# Patient Record
Sex: MALE | Race: WHITE | Employment: FULL TIME | URBAN - METROPOLITAN AREA
[De-identification: names, ages, dates, MRNs, and addresses within clinical notes are randomized per-mention and may not be internally consistent; named-entity substitution may affect disease eponyms.]

---

## 2018-05-03 ENCOUNTER — HOSPITAL ENCOUNTER (OUTPATIENT)
Dept: SURGERY | Age: 43
Discharge: HOME | End: 2018-05-03

## 2018-05-04 VITALS — HEIGHT: 67 IN | WEIGHT: 227 LBS | BODY MASS INDEX: 35.63 KG/M2

## 2018-05-04 RX ORDER — METFORMIN HYDROCHLORIDE 500 MG/1
500 TABLET ORAL 2 TIMES DAILY WITH MEALS
COMMUNITY
End: 2018-05-17 | Stop reason: SDUPTHER

## 2018-05-04 RX ORDER — LISINOPRIL 20 MG/1
20 TABLET ORAL DAILY
COMMUNITY
End: 2018-05-17 | Stop reason: SDUPTHER

## 2018-05-04 RX ORDER — BISMUTH SUBSALICYLATE 262 MG
1 TABLET,CHEWABLE ORAL DAILY
COMMUNITY
End: 2018-11-09 | Stop reason: ALTCHOICE

## 2018-05-04 RX ORDER — MULTIVITAMIN
1 TABLET ORAL DAILY
COMMUNITY
End: 2018-06-25 | Stop reason: ALTCHOICE

## 2018-05-04 NOTE — PERIOP NOTES
Patient verified name, , and surgery as listed in [facility]. Type 1B surgery, phone assessment complete. Orders  received. Labs per surgeon: none  Labs per anesthesia protocol: none      Patient answered medical/surgical history questions at their best of ability. All prior to admission medications documented in [facility]. Patient instructed to take the following medications the day of surgery according to anesthesia guidelines with a small sip of water: none . Hold all vitamins 7 days prior to surgery and NSAIDS 5 days prior to surgery. Medications to be held vitamins/supplements. Pt instructed to take 1/2 normal dose of Toujeo DOS or 17 units. Patient instructed on the following:  Arrive at A Entrance, time of arrival to be called the day before by 1700  NPO after midnight including gum, mints, and ice chips  Responsible adult must drive patient to the hospital, stay during surgery, and patient will  need supervision 24 hours after anesthesia  Use dial in shower the night before surgery and on the morning of surgery  Leave all valuables (money and jewelry) at home but bring insurance card and ID on  DOS  Do not wear make-up, nail polish, lotions, cologne, perfumes, powders, or oil on skin. Patient teach back successful and patient demonstrates knowledge of instruction.

## 2018-05-05 PROBLEM — S43.432A SUPERIOR GLENOID LABRUM LESION OF LEFT SHOULDER: Status: ACTIVE | Noted: 2018-05-05

## 2018-05-05 PROBLEM — S46.112A STRAIN OF MUSCLE, FASCIA AND TENDON OF LONG HEAD OF BICEPS, LEFT ARM, INITIAL ENCOUNTER: Status: ACTIVE | Noted: 2018-05-05

## 2018-05-05 PROBLEM — S46.012A TRAUMATIC TEAR OF LEFT ROTATOR CUFF: Status: ACTIVE | Noted: 2018-05-05

## 2018-05-05 PROBLEM — M19.012 DEGENERATIVE JOINT DISEASE OF LEFT ACROMIOCLAVICULAR JOINT: Status: ACTIVE | Noted: 2018-05-05

## 2018-05-05 NOTE — BRIEF OP NOTE
BRIEF OPERATIVE NOTE    Date of Procedure: 5/10/2018     Preoperative Diagnosis:  PARTIAL THICKNESS ROTATOR CUFF TEAR LEFT SHOULDER      BICEPITAL STRAIN LEFT SHOULDER      SLAP TEAR LEFT SHOULDER      AC OA LEFT SHOULDER    Postoperative Diagnosis:  SLAP TEAR LEFT SHOULDER      BICEPITAL STRAIN LEFT SHOULDER      AC OA LEFT SHOULDER    Procedure(s): ARTHROSCOPY LEFT SHOULDER ARTHROSCOPIC SUBACROMIAL DECOMPRESSION, DISTAL CLAVICLE RESECTION, EXTENSIVE DEBRIDEMENT SLAP TEAR, GLENOHUMERAL JOINT, SUBACROMIAL SPACE, MINI BICEPS TENODESIS    Surgeon(s) and Role:     * Sukh Hernandes MD - Primary         Assistant Staff: None      Surgical Staff:  Circ-1: (Unknown)  Scrub Tech-1: (Unknown)  Scrub Tech-2: (Unknown)  Scrub Tech-3: (Unknown)  No case tracking events are documented in the log. Anesthesia:  GENERAL WITH INTERSCALENE BLOCK    Estimated Blood Loss: 20 cc. Complications: NONE    Implants:     Implant Name Type Inv.  Item Serial No.  Lot No. LRB No. Used Action   ANCHOR SUT 5.5MM W/NDL PEEK ZP --  - [id number]   ANCHOR SUT 5.5MM W/NDL PEEK ZP --  [id number] [employer] [id number] Left 1 Implanted       Sukh Hernandes MD

## 2018-05-05 NOTE — H&P
[facility] HISTORY AND PHYSICAL    Subjective:     Patient is a 37 y.o. RHD MALE WITH LEFT SHOULDER PAIN. SEE OFFICE NOTE. Patient Active Problem List    Diagnosis Date Noted    Traumatic tear of left rotator cuff 05/05/2018    Strain of muscle, fascia and tendon of long head of biceps, left arm, initial encounter 05/05/2018    Superior glenoid labrum lesion of left shoulder 05/05/2018    Degenerative joint disease of left acromioclavicular joint 05/05/2018     Past Medical History:   Diagnosis Date    Degenerative joint disease of left acromioclavicular joint 5/5/2018    Diabetes ([facility] [address] 2008    type 2.normal fasting (150-200). oral agent and insulin daily     Hypertension     controlled w/med    Strain of muscle(s) and tendon(s) of the rotator cuff of left shoulder, initial encounter     Strain of muscle, fascia and tendon of long head of biceps, left arm, initial encounter       Past Surgical History:   Procedure Laterality Date    HX ACL RECONSTRUCTION      HX HERNIA REPAIR        Prior to Admission medications    Medication Sig Start Date End Date Taking? Authorizing Provider   lisinopril (PRINIVIL, ZESTRIL) 20 mg tablet Take 20 mg by mouth daily. Indications: hypertension    Historical Provider   metFORMIN (GLUCOPHAGE) 500 mg tablet Take 500 mg by mouth two (2) times daily (with meals). Indications: type 2 diabetes mellitus    Historical Provider   multivitamin (ONE A DAY) tablet Take 1 Tab by mouth daily. Per anesthesia protocol: pt instructed to stop med 7 days prior to day of surgery. Historical Provider   cinnamon bark (CINNAMON) 500 mg cap Take 1 Cap by mouth daily. Per anesthesia protocol: pt instructed to stop med 7 days prior to day of surgery. Historical Provider   insulin glargine (TOUJEO MAX SOLOSTAR) 300 unit/mL (3 mL) inpn 35 Units by SubCUTAneous route daily. Per anesthesia protocol:instructed to take 1/2 normal dose (17 units) DOS.    Indications: type 2 diabetes mellitus    Historical Provider     No Known Allergies   Social History   Substance Use Topics    Smoking status: Never Smoker    Smokeless tobacco: Never Used    Alcohol use No      No family history on file. Review of Systems  A comprehensive review of systems was negative except for that written in the HPI. Objective:     No data found. There were no vitals taken for this visit. General:  Alert, cooperative, no distress, appears stated age. Head:  Normocephalic, without obvious abnormality, atraumatic. Back:   Symmetric, no curvature. ROM normal. No CVA tenderness. Lungs:   Clear to auscultation bilaterally. Chest wall:  No tenderness or deformity. Heart:  Regular rate and rhythm, S1, S2 normal, no murmur, click, rub or gallop. Extremities: Extremities normal, atraumatic, no cyanosis or edema. Pulses: 2+ and symmetric all extremities. Skin: Skin color, texture, turgor normal. No rashes or lesions   Lymph nodes: Cervical, supraclavicular, and axillary nodes normal.   Neurologic: CNII-XII intact. Normal strength, sensation and reflexes throughout. Assessment:     Principal Problem:    Traumatic tear of left rotator cuff (5/5/2018)    Active Problems:    Strain of muscle, fascia and tendon of long head of biceps, left arm, initial encounter (5/5/2018)      Superior glenoid labrum lesion of left shoulder (5/5/2018)      Degenerative joint disease of left acromioclavicular joint (5/5/2018)        Plan:     The various methods of treatment have been discussed with the patient and family. PATIENT HAS EXHAUSTED NON-OPERATIVE MODALITIES     After consideration of risks, benefits and other options for treatment, the patient has consented to surgical intervention.     SEE OFFICE NOTE    Anum Castro MD

## 2018-05-09 ENCOUNTER — ANESTHESIA EVENT (OUTPATIENT)
Dept: SURGERY | Age: 43
End: 2018-05-09
Payer: OTHER MISCELLANEOUS

## 2018-05-10 ENCOUNTER — APPOINTMENT (OUTPATIENT)
Dept: GENERAL RADIOLOGY | Age: 43
End: 2018-05-10
Attending: ORTHOPAEDIC SURGERY
Payer: OTHER MISCELLANEOUS

## 2018-05-10 ENCOUNTER — ANESTHESIA (OUTPATIENT)
Dept: SURGERY | Age: 43
End: 2018-05-10
Payer: OTHER MISCELLANEOUS

## 2018-05-10 ENCOUNTER — HOSPITAL ENCOUNTER (OUTPATIENT)
Age: 43
Setting detail: OBSERVATION
Discharge: HOME | End: 2018-05-11
Attending: ORTHOPAEDIC SURGERY | Admitting: ORTHOPAEDIC SURGERY
Payer: OTHER MISCELLANEOUS

## 2018-05-10 PROBLEM — S46.012A TRAUMATIC TEAR OF LEFT ROTATOR CUFF: Status: RESOLVED | Noted: 2018-05-05 | Resolved: 2018-05-10

## 2018-05-10 PROBLEM — I10 HTN (HYPERTENSION): Status: ACTIVE | Noted: 2018-05-10

## 2018-05-10 LAB
EST. AVERAGE GLUCOSE BLD GHB EST-MCNC: 246 MG/DL
GLUCOSE BLD STRIP.AUTO-MCNC: 236 MG/DL (ref 65–100)
GLUCOSE BLD STRIP.AUTO-MCNC: 256 MG/DL (ref 65–100)
GLUCOSE BLD STRIP.AUTO-MCNC: 262 MG/DL (ref 65–100)
GLUCOSE BLD STRIP.AUTO-MCNC: 287 MG/DL (ref 65–100)
GLUCOSE BLD STRIP.AUTO-MCNC: 341 MG/DL (ref 65–100)
HBA1C MFR BLD: 10.2 % (ref 4.8–6)

## 2018-05-10 PROCEDURE — 77030033005 HC TBNG ARTHSC PMP STRY -B: Performed by: ORTHOPAEDIC SURGERY

## 2018-05-10 PROCEDURE — 82962 GLUCOSE BLOOD TEST: CPT

## 2018-05-10 PROCEDURE — 77030019908 HC STETH ESOPH SIMS -A: Performed by: ANESTHESIOLOGY

## 2018-05-10 PROCEDURE — 77030027384 HC PRB ARTHSCP SERFAS STRY -C: Performed by: ORTHOPAEDIC SURGERY

## 2018-05-10 PROCEDURE — 74011636637 HC RX REV CODE- 636/637: Performed by: INTERNAL MEDICINE

## 2018-05-10 PROCEDURE — C1713 ANCHOR/SCREW BN/BN,TIS/BN: HCPCS | Performed by: ORTHOPAEDIC SURGERY

## 2018-05-10 PROCEDURE — 77030032490 HC SLV COMPR SCD KNE COVD -B

## 2018-05-10 PROCEDURE — 77030006668 HC BLD SHV MENSCS STRY -B: Performed by: ORTHOPAEDIC SURGERY

## 2018-05-10 PROCEDURE — 77030008703 HC TU ET UNCUF COVD -A: Performed by: ANESTHESIOLOGY

## 2018-05-10 PROCEDURE — 76060000034 HC ANESTHESIA 1.5 TO 2 HR: Performed by: ORTHOPAEDIC SURGERY

## 2018-05-10 PROCEDURE — A4565 SLINGS: HCPCS | Performed by: ORTHOPAEDIC SURGERY

## 2018-05-10 PROCEDURE — 77030006891 HC BLD SHV RESECT STRY -B: Performed by: ORTHOPAEDIC SURGERY

## 2018-05-10 PROCEDURE — 74011000258 HC RX REV CODE- 258: Performed by: ORTHOPAEDIC SURGERY

## 2018-05-10 PROCEDURE — 76010000161 HC OR TIME 1 TO 1.5 HR INTENSV-TIER 1: Performed by: ORTHOPAEDIC SURGERY

## 2018-05-10 PROCEDURE — 77030020782 HC GWN BAIR PAWS FLX 3M -B: Performed by: ANESTHESIOLOGY

## 2018-05-10 PROCEDURE — 77030003666 HC NDL SPINAL BD -A: Performed by: ORTHOPAEDIC SURGERY

## 2018-05-10 PROCEDURE — 74011250636 HC RX REV CODE- 250/636

## 2018-05-10 PROCEDURE — 77030031139 HC SUT VCRL2 J&J -A: Performed by: ORTHOPAEDIC SURGERY

## 2018-05-10 PROCEDURE — 77030002986 HC SUT PROL J&J -A: Performed by: ORTHOPAEDIC SURGERY

## 2018-05-10 PROCEDURE — 77030003602 HC NDL NRV BLK BBMI -B: Performed by: ANESTHESIOLOGY

## 2018-05-10 PROCEDURE — 99218 HC RM OBSERVATION: CPT

## 2018-05-10 PROCEDURE — 74011250636 HC RX REV CODE- 250/636: Performed by: ORTHOPAEDIC SURGERY

## 2018-05-10 PROCEDURE — 77030002916 HC SUT ETHLN J&J -A: Performed by: ORTHOPAEDIC SURGERY

## 2018-05-10 PROCEDURE — 77030018836 HC SOL IRR NACL ICUM -A: Performed by: ORTHOPAEDIC SURGERY

## 2018-05-10 PROCEDURE — 74011250637 HC RX REV CODE- 250/637: Performed by: ORTHOPAEDIC SURGERY

## 2018-05-10 PROCEDURE — 74011250637 HC RX REV CODE- 250/637: Performed by: ANESTHESIOLOGY

## 2018-05-10 PROCEDURE — 77030004453 HC BUR SHV STRY -B: Performed by: ORTHOPAEDIC SURGERY

## 2018-05-10 PROCEDURE — 74011000250 HC RX REV CODE- 250

## 2018-05-10 PROCEDURE — 73030 X-RAY EXAM OF SHOULDER: CPT

## 2018-05-10 PROCEDURE — 77030027138 HC INCENT SPIROMETER -A: Performed by: ORTHOPAEDIC SURGERY

## 2018-05-10 PROCEDURE — 94760 N-INVAS EAR/PLS OXIMETRY 1: CPT

## 2018-05-10 PROCEDURE — 83036 HEMOGLOBIN GLYCOSYLATED A1C: CPT | Performed by: ORTHOPAEDIC SURGERY

## 2018-05-10 PROCEDURE — 74011636637 HC RX REV CODE- 636/637: Performed by: ANESTHESIOLOGY

## 2018-05-10 PROCEDURE — 77030018986 HC SUT ETHBND4 J&J -B: Performed by: ORTHOPAEDIC SURGERY

## 2018-05-10 PROCEDURE — 76210000006 HC OR PH I REC 0.5 TO 1 HR: Performed by: ORTHOPAEDIC SURGERY

## 2018-05-10 PROCEDURE — 74011000250 HC RX REV CODE- 250: Performed by: ORTHOPAEDIC SURGERY

## 2018-05-10 PROCEDURE — 74011250636 HC RX REV CODE- 250/636: Performed by: ANESTHESIOLOGY

## 2018-05-10 PROCEDURE — 74011250637 HC RX REV CODE- 250/637: Performed by: INTERNAL MEDICINE

## 2018-05-10 PROCEDURE — 77030033073 HC TBNG ARTHSC PMP OUTFLO STRY -B: Performed by: ORTHOPAEDIC SURGERY

## 2018-05-10 DEVICE — 5.5MM PEEK ZIP SUTURE ANCHOR WITH ¿ CIRCLE TAPER NEEDLES, #2 FORCE FIBER
Type: IMPLANTABLE DEVICE | Site: SHOULDER | Status: FUNCTIONAL
Brand: PEEK ZIP

## 2018-05-10 RX ORDER — SODIUM CHLORIDE 0.9 % (FLUSH) 0.9 %
5-10 SYRINGE (ML) INJECTION AS NEEDED
Status: DISCONTINUED | OUTPATIENT
Start: 2018-05-10 | End: 2018-05-10 | Stop reason: HOSPADM

## 2018-05-10 RX ORDER — NALOXONE HYDROCHLORIDE 0.4 MG/ML
0.2 INJECTION, SOLUTION INTRAMUSCULAR; INTRAVENOUS; SUBCUTANEOUS AS NEEDED
Status: DISCONTINUED | OUTPATIENT
Start: 2018-05-10 | End: 2018-05-10 | Stop reason: HOSPADM

## 2018-05-10 RX ORDER — FACIAL-BODY WIPES
10 EACH TOPICAL DAILY PRN
Status: DISCONTINUED | OUTPATIENT
Start: 2018-05-10 | End: 2018-05-11 | Stop reason: HOSPADM

## 2018-05-10 RX ORDER — SODIUM CHLORIDE, SODIUM LACTATE, POTASSIUM CHLORIDE, CALCIUM CHLORIDE 600; 310; 30; 20 MG/100ML; MG/100ML; MG/100ML; MG/100ML
75 INJECTION, SOLUTION INTRAVENOUS CONTINUOUS
Status: DISCONTINUED | OUTPATIENT
Start: 2018-05-10 | End: 2018-05-10 | Stop reason: HOSPADM

## 2018-05-10 RX ORDER — METFORMIN HYDROCHLORIDE 500 MG/1
500 TABLET ORAL 2 TIMES DAILY WITH MEALS
Status: DISCONTINUED | OUTPATIENT
Start: 2018-05-10 | End: 2018-05-11 | Stop reason: HOSPADM

## 2018-05-10 RX ORDER — INSULIN GLARGINE 100 [IU]/ML
28 INJECTION, SOLUTION SUBCUTANEOUS DAILY
Status: DISCONTINUED | OUTPATIENT
Start: 2018-05-11 | End: 2018-05-11 | Stop reason: HOSPADM

## 2018-05-10 RX ORDER — LIDOCAINE HYDROCHLORIDE AND EPINEPHRINE 10; 10 MG/ML; UG/ML
INJECTION, SOLUTION INFILTRATION; PERINEURAL AS NEEDED
Status: DISCONTINUED | OUTPATIENT
Start: 2018-05-10 | End: 2018-05-10 | Stop reason: HOSPADM

## 2018-05-10 RX ORDER — LIDOCAINE HYDROCHLORIDE 10 MG/ML
0.1 INJECTION INFILTRATION; PERINEURAL AS NEEDED
Status: DISCONTINUED | OUTPATIENT
Start: 2018-05-10 | End: 2018-05-10 | Stop reason: HOSPADM

## 2018-05-10 RX ORDER — FAMOTIDINE 20 MG/1
20 TABLET, FILM COATED ORAL ONCE
Status: COMPLETED | OUTPATIENT
Start: 2018-05-10 | End: 2018-05-10

## 2018-05-10 RX ORDER — ROCURONIUM BROMIDE 10 MG/ML
INJECTION, SOLUTION INTRAVENOUS AS NEEDED
Status: DISCONTINUED | OUTPATIENT
Start: 2018-05-10 | End: 2018-05-10 | Stop reason: HOSPADM

## 2018-05-10 RX ORDER — HYDROMORPHONE HYDROCHLORIDE 2 MG/1
2 TABLET ORAL
Status: DISCONTINUED | OUTPATIENT
Start: 2018-05-10 | End: 2018-05-11 | Stop reason: HOSPADM

## 2018-05-10 RX ORDER — OXYCODONE AND ACETAMINOPHEN 5; 325 MG/1; MG/1
1 TABLET ORAL AS NEEDED
Status: DISCONTINUED | OUTPATIENT
Start: 2018-05-10 | End: 2018-05-10 | Stop reason: HOSPADM

## 2018-05-10 RX ORDER — LISINOPRIL 20 MG/1
20 TABLET ORAL DAILY
Status: DISCONTINUED | OUTPATIENT
Start: 2018-05-10 | End: 2018-05-11 | Stop reason: HOSPADM

## 2018-05-10 RX ORDER — SUCCINYLCHOLINE CHLORIDE 20 MG/ML
INJECTION INTRAMUSCULAR; INTRAVENOUS AS NEEDED
Status: DISCONTINUED | OUTPATIENT
Start: 2018-05-10 | End: 2018-05-10 | Stop reason: HOSPADM

## 2018-05-10 RX ORDER — DOCUSATE SODIUM 100 MG/1
100 CAPSULE, LIQUID FILLED ORAL 2 TIMES DAILY
Status: DISCONTINUED | OUTPATIENT
Start: 2018-05-11 | End: 2018-05-11 | Stop reason: HOSPADM

## 2018-05-10 RX ORDER — METFORMIN HYDROCHLORIDE 500 MG/1
500 TABLET ORAL 2 TIMES DAILY WITH MEALS
Status: DISCONTINUED | OUTPATIENT
Start: 2018-05-11 | End: 2018-05-10 | Stop reason: SDUPTHER

## 2018-05-10 RX ORDER — PROMETHAZINE HYDROCHLORIDE 25 MG/1
25 TABLET ORAL
Status: DISCONTINUED | OUTPATIENT
Start: 2018-05-10 | End: 2018-05-11 | Stop reason: HOSPADM

## 2018-05-10 RX ORDER — MIDAZOLAM HYDROCHLORIDE 1 MG/ML
2 INJECTION, SOLUTION INTRAMUSCULAR; INTRAVENOUS
Status: DISCONTINUED | OUTPATIENT
Start: 2018-05-10 | End: 2018-05-10 | Stop reason: HOSPADM

## 2018-05-10 RX ORDER — LIDOCAINE HYDROCHLORIDE 20 MG/ML
INJECTION, SOLUTION EPIDURAL; INFILTRATION; INTRACAUDAL; PERINEURAL AS NEEDED
Status: DISCONTINUED | OUTPATIENT
Start: 2018-05-10 | End: 2018-05-10 | Stop reason: HOSPADM

## 2018-05-10 RX ORDER — PROPOFOL 10 MG/ML
INJECTION, EMULSION INTRAVENOUS AS NEEDED
Status: DISCONTINUED | OUTPATIENT
Start: 2018-05-10 | End: 2018-05-10 | Stop reason: HOSPADM

## 2018-05-10 RX ORDER — TEMAZEPAM 15 MG/1
15 CAPSULE ORAL
Status: DISCONTINUED | OUTPATIENT
Start: 2018-05-10 | End: 2018-05-11 | Stop reason: HOSPADM

## 2018-05-10 RX ORDER — HYDROMORPHONE HYDROCHLORIDE 4 MG/1
4 TABLET ORAL
Status: DISCONTINUED | OUTPATIENT
Start: 2018-05-10 | End: 2018-05-11 | Stop reason: HOSPADM

## 2018-05-10 RX ORDER — HYDROMORPHONE HYDROCHLORIDE 2 MG/ML
0.5 INJECTION, SOLUTION INTRAMUSCULAR; INTRAVENOUS; SUBCUTANEOUS
Status: DISCONTINUED | OUTPATIENT
Start: 2018-05-10 | End: 2018-05-10 | Stop reason: HOSPADM

## 2018-05-10 RX ORDER — SODIUM CHLORIDE 9 MG/ML
75 INJECTION, SOLUTION INTRAVENOUS CONTINUOUS
Status: DISPENSED | OUTPATIENT
Start: 2018-05-10 | End: 2018-05-11

## 2018-05-10 RX ORDER — SODIUM CHLORIDE 0.9 % (FLUSH) 0.9 %
5-10 SYRINGE (ML) INJECTION EVERY 8 HOURS
Status: DISCONTINUED | OUTPATIENT
Start: 2018-05-10 | End: 2018-05-11 | Stop reason: HOSPADM

## 2018-05-10 RX ORDER — CEFAZOLIN SODIUM/WATER 2 G/20 ML
2 SYRINGE (ML) INTRAVENOUS ONCE
Status: COMPLETED | OUTPATIENT
Start: 2018-05-10 | End: 2018-05-10

## 2018-05-10 RX ORDER — SODIUM CHLORIDE 0.9 % (FLUSH) 0.9 %
5-10 SYRINGE (ML) INJECTION AS NEEDED
Status: DISCONTINUED | OUTPATIENT
Start: 2018-05-10 | End: 2018-05-11 | Stop reason: HOSPADM

## 2018-05-10 RX ORDER — HYDROMORPHONE HYDROCHLORIDE 2 MG/ML
1 INJECTION, SOLUTION INTRAMUSCULAR; INTRAVENOUS; SUBCUTANEOUS
Status: DISCONTINUED | OUTPATIENT
Start: 2018-05-10 | End: 2018-05-11 | Stop reason: HOSPADM

## 2018-05-10 RX ORDER — POLYETHYLENE GLYCOL 3350 17 G/17G
17 POWDER, FOR SOLUTION ORAL
Status: DISCONTINUED | OUTPATIENT
Start: 2018-05-10 | End: 2018-05-11 | Stop reason: HOSPADM

## 2018-05-10 RX ORDER — ONDANSETRON 2 MG/ML
INJECTION INTRAMUSCULAR; INTRAVENOUS AS NEEDED
Status: DISCONTINUED | OUTPATIENT
Start: 2018-05-10 | End: 2018-05-10 | Stop reason: HOSPADM

## 2018-05-10 RX ORDER — INSULIN LISPRO 100 [IU]/ML
INJECTION, SOLUTION INTRAVENOUS; SUBCUTANEOUS
Status: DISCONTINUED | OUTPATIENT
Start: 2018-05-10 | End: 2018-05-11 | Stop reason: HOSPADM

## 2018-05-10 RX ORDER — INSULIN GLARGINE 100 [IU]/ML
30 INJECTION, SOLUTION SUBCUTANEOUS DAILY
Status: DISCONTINUED | OUTPATIENT
Start: 2018-05-11 | End: 2018-05-10 | Stop reason: SDUPTHER

## 2018-05-10 RX ORDER — FENTANYL CITRATE 50 UG/ML
INJECTION, SOLUTION INTRAMUSCULAR; INTRAVENOUS AS NEEDED
Status: DISCONTINUED | OUTPATIENT
Start: 2018-05-10 | End: 2018-05-10 | Stop reason: HOSPADM

## 2018-05-10 RX ADMIN — CEFAZOLIN SODIUM 1 G: 1 INJECTION, POWDER, FOR SOLUTION INTRAMUSCULAR; INTRAVENOUS at 16:47

## 2018-05-10 RX ADMIN — ONDANSETRON 4 MG: 2 INJECTION INTRAMUSCULAR; INTRAVENOUS at 09:03

## 2018-05-10 RX ADMIN — HYDROMORPHONE HYDROCHLORIDE 4 MG: 4 TABLET ORAL at 20:41

## 2018-05-10 RX ADMIN — FAMOTIDINE 20 MG: 20 TABLET ORAL at 06:13

## 2018-05-10 RX ADMIN — ROCURONIUM BROMIDE 5 MG: 10 INJECTION, SOLUTION INTRAVENOUS at 08:06

## 2018-05-10 RX ADMIN — PROMETHAZINE HYDROCHLORIDE 25 MG: 25 TABLET ORAL at 17:50

## 2018-05-10 RX ADMIN — FENTANYL CITRATE 100 MCG: 50 INJECTION, SOLUTION INTRAMUSCULAR; INTRAVENOUS at 07:55

## 2018-05-10 RX ADMIN — INSULIN LISPRO 6 UNITS: 100 INJECTION, SOLUTION INTRAVENOUS; SUBCUTANEOUS at 16:48

## 2018-05-10 RX ADMIN — SODIUM CHLORIDE, SODIUM LACTATE, POTASSIUM CHLORIDE, AND CALCIUM CHLORIDE 75 ML/HR: 600; 310; 30; 20 INJECTION, SOLUTION INTRAVENOUS at 06:13

## 2018-05-10 RX ADMIN — MIDAZOLAM HYDROCHLORIDE 2 MG: 1 INJECTION, SOLUTION INTRAMUSCULAR; INTRAVENOUS at 07:11

## 2018-05-10 RX ADMIN — Medication 2 G: at 08:02

## 2018-05-10 RX ADMIN — HYDROMORPHONE HYDROCHLORIDE 4 MG: 4 TABLET ORAL at 16:55

## 2018-05-10 RX ADMIN — SODIUM CHLORIDE 75 ML/HR: 900 INJECTION, SOLUTION INTRAVENOUS at 12:00

## 2018-05-10 RX ADMIN — HYDROMORPHONE HYDROCHLORIDE 4 MG: 4 TABLET ORAL at 13:14

## 2018-05-10 RX ADMIN — SODIUM CHLORIDE, SODIUM LACTATE, POTASSIUM CHLORIDE, AND CALCIUM CHLORIDE: 600; 310; 30; 20 INJECTION, SOLUTION INTRAVENOUS at 08:35

## 2018-05-10 RX ADMIN — HYDROMORPHONE HYDROCHLORIDE 1 MG: 2 INJECTION, SOLUTION INTRAMUSCULAR; INTRAVENOUS; SUBCUTANEOUS at 15:24

## 2018-05-10 RX ADMIN — PROPOFOL 300 MG: 10 INJECTION, EMULSION INTRAVENOUS at 08:06

## 2018-05-10 RX ADMIN — LISINOPRIL 20 MG: 20 TABLET ORAL at 13:14

## 2018-05-10 RX ADMIN — LIDOCAINE HYDROCHLORIDE 80 MG: 20 INJECTION, SOLUTION EPIDURAL; INFILTRATION; INTRACAUDAL; PERINEURAL at 08:06

## 2018-05-10 RX ADMIN — METFORMIN HYDROCHLORIDE 500 MG: 500 TABLET, FILM COATED ORAL at 16:48

## 2018-05-10 RX ADMIN — INSULIN HUMAN 10 UNITS: 100 INJECTION, SOLUTION PARENTERAL at 06:40

## 2018-05-10 RX ADMIN — INSULIN LISPRO 6 UNITS: 100 INJECTION, SOLUTION INTRAVENOUS; SUBCUTANEOUS at 22:00

## 2018-05-10 RX ADMIN — SUCCINYLCHOLINE CHLORIDE 180 MG: 20 INJECTION INTRAMUSCULAR; INTRAVENOUS at 08:06

## 2018-05-10 RX ADMIN — HYDROMORPHONE HYDROCHLORIDE 1 MG: 2 INJECTION, SOLUTION INTRAMUSCULAR; INTRAVENOUS; SUBCUTANEOUS at 18:18

## 2018-05-10 NOTE — OP NOTES
[address] [place] [address] REPORT    Starla Hoskins  MR#: [id number]  : 1975  ACCOUNT #: [de-identified]   DATE OF SERVICE: 05/10/2018    PREOPERATIVE DIAGNOSES:  1. Partial-thickness rotator cuff tear, left shoulder. 2. SLAP tear, left shoulder. 3.  Bicipital strain, left shoulder. 4.  AC joint arthritis, left shoulder. POSTOPERATIVE DIAGNOSES:  1. SLAP tear, left shoulder. 2.  Bicipital strain, left shoulder. 3.  AC joint arthritis, left shoulder. PROCEDURES PERFORMED:  Arthroscopy of left shoulder, arthroscopic subacromial decompression, arthroscopic distal clavicle resection, extensive debridement of SLAP tear, glenohumeral joint, subacromial space, mini open biceps tenodesis. OPERATIVE SURGEON:  Orlando Price. Wili Solomon MD        PATHOLOGY:   1. Type 2 acromion. 2.  AC joint arthritis. 3.  Type 2 SLAP tear. 4.  Bicipital strain. CPT CODES:  [id number], [id number], [id number], [id number]. ICD-10 CODES:  R73.821, P2280983, M19.012. IMPLANTS/HARDWARE UTILIZED:  One [place] 5.5 anchor. ANESTHESIA:  General with an interscalene block. BLOOD LOSS:  20 mL. COMPLICATIONS:  None. INDICATIONS:  The patient is a 70-year-old gentleman that injured his left shoulder on the job. Preoperative physical exam, radiographs and MR demonstrate a partial-thickness rotator cuff tear, a SLAP tear, bicipital strain, AC joint arthritis. The patient has exhausted nonoperative modalities, elected for operative intervention. DESCRIPTION OF PROCEDURE:  Following identification of the patient, the patient taken to the operative suite. Following administration of general anesthesia, interscalene block for postop pain control, 2 g of IV Ancef, and measurement of a hemoglobin A1c and fasting blood glucose of 10.2 and 341, the patient positioned on the operating table in the supine fashion.   Left shoulder was examined under anesthesia and noted to be stable through a full range of motion. At this point, the patient was carefully positioned in the lateral decubitus position, right side down. Axillary roll was placed, beanbag was placed, and care was taken to pad both dependent lower extremities. Left arm was then placed in the Sai [employer] traction device in 15 pounds of traction. Left shoulder was then prepped and draped in sterile fashion. Subacromial space was then injected with 10 mL of 1% Xylocaine with epinephrine. Scope was introduced into the shoulder. Diagnostic arthroscopy then commenced. Articular surface of the humeral head and glenoid were visualized and noted to be intact. Anterior, posterior, superior, and inferior labrum were visualized. There was a type 2 SLAP tear superiorly with an unstable biceps anchor. The tear extended into the biceps tendon itself. There was marked erythema, particularly in the extraarticular portion of the biceps tendon. The biceps itself was small caliber. The undersurface rotator cuff in its entirety was intact including subscapularis, supraspinatus, infraspinatus, and teres minor. The scope was then flip-flopped from the posterior to anterior portal.  Posterior cuff was intact. Scope was then placed in the subacromial space. Lateral portal was then established. Hypertrophic hemorrhagic bursal tissue was then resected and there was significant bursal tissue. There was no full-thickness cuff tear. At this point, using an Oratec wand and acromionizer bur, an arthroscopic subacromial decompression was then performed. This was taken down to the level of the deltoid fascia anteriorly, AC joint posteriorly, contoured from medial to lateral.  Once this was then completed, our attention was then turned to resecting the distal clavicle. The distal 10 mm of distal clavicle was then resected. Care was taken to preserve the posterior superior capsule.   At this point, given the combination of pathology, it was elected to perform a mini open biceps tenodesis. The lateral portal was extended to 3 cm. Deltoid split was carried up to the acromion. The biceps tendon was identified. It was markedly erythematous and again small caliber. It was dissected free. It was tagged and brought out to length and tenodesed utilizing one 5.5 anchor. This was oversewn using #2 Mersilene sutures. At this point, the scope was then placed back in the glenohumeral joint. Stump of the biceps and the SLAP tear were debrided back to stable structures. Again, the undersurface of the rotator cuff was intact. Scope was then placed back to the bursal side. The bursal side of the cuff repair was stable. At this point, with the procedure complete, arthroscopic equipment was removed from the shoulder. The portals were approximated using 2-0 nylon horizontal mattress sutures. Lateral wounds were closed with 0 Vicryl figure-of-eight sutures and a 2-0 Prolene subcuticular stitch. A sterile dressing was applied. Cryo/Cuff and swathe was applied. The patient was then transferred to recovery in stable condition. CPT CODES:  [id number], [id number], [id number], [id number]. ICD-10 CODES:  R21.926, N1638103, M19.012. HARDWARE UTILIZED:  One 5.5 [place] anchor. This injury was secondary to workplace injury. Again, his hemoglobin A1c and fasting blood glucose were 10.2 and 341 respectively, very elevated.       MD ZABRINA Lanier / Claudio Burk  D: 05/10/2018 09:30     T: 05/10/2018 12:17  JOB #: [id number]  CC: Cynthia Mayorga MD

## 2018-05-10 NOTE — PERIOP NOTES
TRANSFER - OUT REPORT:    Verbal report given to Henrry Norman RN on [employer]  being transferred to [facility] for routine post - op       Report consisted of patients Situation, Background, Assessment and   Recommendations(SBAR). Information from the following report(s) SBAR, Kardex, Intake/Output, MAR and Recent Results was reviewed with the receiving nurse. Lines:   Peripheral IV 05/10/18 Right Hand (Active)   Site Assessment Clean, dry, & intact 5/10/2018  9:29 AM   Phlebitis Assessment 0 5/10/2018  9:29 AM   Infiltration Assessment 0 5/10/2018  9:29 AM   Dressing Status Clean, dry, & intact 5/10/2018  9:29 AM   Dressing Type Tape;Transparent 5/10/2018  9:29 AM   Hub Color/Line Status Pink; Infusing 5/10/2018  9:29 AM        Opportunity for questions and clarification was provided.       Patient transported with:   VANCL

## 2018-05-10 NOTE — PROGRESS NOTES
05/10/18 1348   Oxygen Therapy   O2 Sat (%) 97 %   Pulse via Oximetry 94 beats per minute   O2 Device Room air   Incentive Spirometry Treatment   Actual Volume (ml) 2000 ml   Number of Attempts 505 [place] D[place]az Notes Reviewed. Pt working on IS. Pt encouraged to do 10 breaths per hour while awake on IS. Good NPC. No respiratory distress noted at this time. No complications noted at this time. Pt states he takes no inhalers at home. B/S are clear.

## 2018-05-10 NOTE — PERIOP NOTES
Made multiple attempts to contact Cullen Stallworth. To relay info regarding delay in PACU. Shayne Aragon was sent up to patient's room to wait for him there.

## 2018-05-10 NOTE — PROGRESS NOTES
Care Management Interventions  Mode of Transport at Discharge: Self  Transition of Care Consult (CM Consult): Discharge Planning  Current Support Network: Own Home  Confirm Follow Up Transport: Family  Plan discussed with Pt/Family/Caregiver: Yes  Freedom of Choice Offered: Yes  Discharge Location  Discharge Placement: Home with outpatient services     Hospitalist consulted me to assist patient with finding primary care. Patient admitted under Workers comp for shoulder surgery but he has [employer] ins as primary coverage. Patient states he recently obtained medical ins and has not been able to find  primary care to help manage his diabetes. The Md offices he has called are not accepting new patients. Patient states his last primary care was through [place] several years ago but he has no wishes to go through [place] now. He reports not liking the way  they manage his DM. Patient asking for help with his DM while in hospital and on d/c so that he does not have to use Urgent care as PCP. Diabetic nurse consulted and I will call around to local providers to see if we can find more immediate care. Patient to get me his [place] info so that I can proceed with in-network providers. Will follow.   Bonifacio Ybarra

## 2018-05-10 NOTE — CONSULTS
HOSPITALIST H&P/CONSULT  NAME:  Henry Johnson   Age:  37 y.o.  :   1975   MRN:   [id number]  PCP: Charley Willoughby MD  Consulting MD:  Treatment Team: Attending Provider: Stew North MD; Consulting Provider: Gabbi Mcbride MD  HPI:   37 M with PMH of HTN, DM2, Obesity, Left shoulder pain admitted to ortho service and is s/p Arthroscopy of left shoulder, arthroscopic subacromial decompression, arthroscopic distal clavicle resection, extensive debridement of SLAP tear, glenohumeral joint, subacromial space, mini open biceps tenodesis. Hospitalist consulted for medical management. Uncontrolled Diabetic with BS ranging from 150-250 st home. Takes Joujeo and metformin at home, Has not been able to find a PCP and has seen Endocrinologist in past. Has some pain in his left shoulder post Op. Denies Hypoglycemia symptoms, Fever, chills, abd pain.        10 point ROS done and is negative except as noted in HPI. Past Medical History:   Diagnosis Date    Degenerative joint disease of left acromioclavicular joint 2018    Diabetes ([facility] [address] 2008    type 2.normal fasting (150-200). oral agent and insulin daily     Hypertension     controlled w/med    Strain of muscle(s) and tendon(s) of the rotator cuff of left shoulder, initial encounter     Strain of muscle, fascia and tendon of long head of biceps, left arm, initial encounter       Past Surgical History:   Procedure Laterality Date    HX ACL RECONSTRUCTION      HX HERNIA REPAIR        Prior to Admission Medications   Prescriptions Last Dose Informant Patient Reported? Taking?   cinnamon bark (CINNAMON) 500 mg cap 5/3/2018 at Unknown time  Yes Yes   Sig: Take 1 Cap by mouth daily. Per anesthesia protocol: pt instructed to stop med 7 days prior to day of surgery. insulin glargine (TOUJEO MAX SOLOSTAR) 300 unit/mL (3 mL) inpn 5/10/2018 at Unknown time  Yes Yes   Si Units by SubCUTAneous route daily.  Per anesthesia protocol:instructed to take 1/2 normal dose (17 units) DOS. Indications: type 2 diabetes mellitus   lisinopril (PRINIVIL, ZESTRIL) 20 mg tablet 2018 at Unknown time  Yes Yes   Sig: Take 20 mg by mouth daily. Indications: hypertension   metFORMIN (GLUCOPHAGE) 500 mg tablet 2018 at Unknown time  Yes Yes   Sig: Take 500 mg by mouth two (2) times daily (with meals). Indications: type 2 diabetes mellitus   multivitamin (ONE A DAY) tablet 5/3/2018 at Unknown time  Yes Yes   Sig: Take 1 Tab by mouth daily. Per anesthesia protocol: pt instructed to stop med 7 days prior to day of surgery. Facility-Administered Medications: None     Home meds reconciled. No Known Allergies   Social History   Substance Use Topics    Smoking status: Never Smoker    Smokeless tobacco: Never Used    Alcohol use No      History reviewed. No pertinent family history. There is no immunization history on file for this patient. Objective:     Visit Vitals    /86 (BP 1 Location: Right arm, BP Patient Position: At rest)    Pulse 89    Temp 98.2 °F (36.8 °C)    Resp 15    Ht 5' 7\" (1.702 m)    Wt 105.5 kg (232 lb 8 oz)    SpO2 97%    BMI 36.41 kg/m2      Temp (24hrs), Av.3 °F (36.8 °C), Min:97.8 °F (36.6 °C), Max:98.8 °F (37.1 °C)    Oxygen Therapy  O2 Sat (%): 97 % (05/10/18 1348)  Pulse via Oximetry: 94 beats per minute (05/10/18 1348)  O2 Device: Room air (05/10/18 1348)  O2 Flow Rate (L/min): 2 l/min (05/10/18 1014)  Physical Exam:  General:    Alert, cooperative, no distress   Head:   NCAT. No obvious deformity  Nose:  Nares normal. No drainage  Lungs:   CTABL. No wheezing/rhonchi/rales  Heart:   RRR. No m/r/g. Abdomen:   S/nt/nd. Bowel sounds normal.   Extremities: Left shoulder has bandage and sling post op. Skin:     No rashes or lesions. Not Jaundiced, multiple tattoos  Neurologic: Moves all extremities.   no gross focal deficits      Data Review:   Recent Results (from the past 24 hour(s))   HEMOGLOBIN A1C WITH EAG Collection Time: 05/10/18  6:16 AM   Result Value Ref Range    Hemoglobin A1c 10.2 (H) 4.8 - 6.0 %    Est. average glucose 246 mg/dL   GLUCOSE, POC    Collection Time: 05/10/18  6:16 AM   Result Value Ref Range    Glucose (POC) 341 (H) 65 - 100 mg/dL   GLUCOSE, POC    Collection Time: 05/10/18  7:27 AM   Result Value Ref Range    Glucose (POC) 236 (H) 65 - 100 mg/dL   GLUCOSE, POC    Collection Time: 05/10/18  9:49 AM   Result Value Ref Range    Glucose (POC) 256 (H) 65 - 100 mg/dL     Imaging /Procedures /Studies:  I personally reviewed all labs, imaging, and other studies this admission:  CXR Results  (Last 48 hours)    None        CT Results  (Last 48 hours)    None          Assessment and Plan: Active Hospital Problems    Diagnosis Date Noted    Uncontrolled type II diabetes mellitus ([facility] [address] 05/10/2018    HTN (hypertension) 05/10/2018    Strain of muscle, fascia and tendon of long head of biceps, left arm, initial encounter 05/05/2018    Superior glenoid labrum lesion of left shoulder 05/05/2018    Degenerative joint disease of left acromioclavicular joint 05/05/2018       PLAN    · Post op care an dpain meds per primary team Ortho  · A1c is 10.2, will start ISS and add Metfromin with Lantus 28 Units daily. Monitor sugars  · Diabetes educator consult. · Resume Home dose of Lisinopril. Some tachycardia and elevated BP is due to pain. · Diabetic diet. Will sign off and be on stand by for any questions. Thank you for the consultation.      Signed By: Rakel Summers MD     May 10, 2018

## 2018-05-10 NOTE — DISCHARGE SUMMARY
[facility] Discharge Summary      Patient ID:  Kathi Degroot  [id number]  1 y.o.  1975    Allergies: Review of patient's allergies indicates no known allergies.     Admit date: 5/10/2018    Discharge date and time: 5/11/2018     Admitting Physician: Kathy Whitmore MD     Discharge Physician: Kathy Whitmore MD      * Admission Diagnoses: Strain of muscle(s) and tendon(s) of the rotator cuff of left shoulder, initial encounter [S46.012A]  Strain of muscle, fascia and tendon of long head of biceps, left arm, initial encounter [S46.112A]  Superior glenoid labrum lesion of left shoulder, initial encounter [J11.965P]  Primary osteoarthritis, left shoulder [M19.012]    * Discharge Diagnoses:   Hospital Problems as of 5/11/2018  Never Reviewed          Codes Class Noted - Resolved POA    Hypomagnesemia ICD-10-CM: I88.75  ICD-9-CM: 275.2  5/11/2018 - Present Yes        Uncontrolled type II diabetes mellitus ([facility] ICD-10-CM: E11.65  ICD-9-CM: 250.02  5/10/2018 - Present Yes        HTN (hypertension) ICD-10-CM: I10  ICD-9-CM: 401.9  5/10/2018 - Present Yes        Strain of muscle, fascia and tendon of long head of biceps, left arm, initial encounter ICD-10-CM: G90.893D  ICD-9-CM: 840.8  5/5/2018 - Present Yes        * (Principal)Superior glenoid labrum lesion of left shoulder ICD-10-CM: K30.093V  ICD-9-CM: 840.7  5/5/2018 - Present Yes        Degenerative joint disease of left acromioclavicular joint ICD-10-CM: M19.012  ICD-9-CM: 715.91  5/5/2018 - Present Yes        RESOLVED: Traumatic tear of left rotator cuff ICD-10-CM: E95.464V  ICD-9-CM: 840.4  5/5/2018 - 5/10/2018 Yes              Surgeon: Kathy Whitmore MD        * Procedure: Procedure(s):             ARTHROSCOPY LEFT SHOULDER ARTHROSCOPIC SUBACROMIAL DECOMPRESSION, DISTAL CLAVICLE RESECTION, EXTENSIVE DEBRIDEMENT SLAP TEAR, GLENOHUMERAL JOINT, SUBACROMIAL SPACE, MINI BICEPS TENODESIS            Perioperative Antibiotics: Ancef  _x__ Vancomycin  ___         Post Op complications: none    * Discharge Condition: good  Wound appears to be healing without any evidence of infection.        * Discharged to: Home    * Follow-up Care/Discharge instructions:  - Resume pre hospital diet            - Resume home medications per medical continuation form     CONTINUE PHYSICAL THERAPY  SLING LEFT SHOULDER  - Follow up in office as scheduled       Signed:  Tim Mahmood MD  5/11/2018  6:51 AM

## 2018-05-10 NOTE — ANESTHESIA POSTPROCEDURE EVALUATION
Post-Anesthesia Evaluation and Assessment    Patient: Gwendolyn Andrews MRN: [id number]  SSN: [id number]    YOB: 1975  Age: 37 y.o. Sex: male       Cardiovascular Function/Vital Signs  Visit Vitals    /89 (BP 1 Location: Right arm, BP Patient Position: At rest)    Pulse (!) 102    Temp 37.1 °C (98.8 °F)    Resp 14    Ht 5' 7\" (1.702 m)    Wt 105.5 kg (232 lb 8 oz)    SpO2 93%    BMI 36.41 kg/m2       Patient is status post general anesthesia for Procedure(s):             ARTHROSCOPY LEFT SHOULDER ARTHROSCOPIC SUBACROMIAL DECOMPRESSION, DISTAL CLAVICLE RESECTION, EXTENSIVE DEBRIDEMENT SLAP TEAR, GLENOHUMERAL JOINT, SUBACROMIAL SPACE, MINI BICEPS TENODESIS . Nausea/Vomiting: None    Postoperative hydration reviewed and adequate. Pain:  Pain Scale 1: Visual (05/10/18 0929)  Pain Intensity 1: 0 (05/10/18 0929)   Managed    Neurological Status:   Neuro (WDL): Exceptions to WDL (05/10/18 0929)  Neuro  Neurologic State: Drowsy (05/10/18 3762)  Orientation Level: Oriented to person;Oriented to place (05/10/18 0929)  Cognition: Follows commands (05/10/18 0929)  Speech: Durenda Camps comprehensable words (05/10/18 0929)  LUE Motor Response: Purposeful (05/10/18 0929)  LLE Motor Response: Pharmacologically paralyzed (05/10/18 0929)  RUE Motor Response: Purposeful (05/10/18 0929)  RLE Motor Response: Purposeful (05/10/18 0929)   At baseline    Mental Status and Level of Consciousness: Arousable    Pulmonary Status:   O2 Device: Nasal cannula (05/10/18 0929)   Adequate oxygenation and airway patent    Complications related to anesthesia: None    Post-anesthesia assessment completed.  No concerns    Signed By: Gumaro Escoto MD     May 10, 2018

## 2018-05-10 NOTE — PROGRESS NOTES
NIKITA met with patient and obtained his insurance information from his girlfriend Fiona Falk) who had brought it from home. NIKITA called [facility] and obtained the patient an appointment on the 17th at 12:50 pm with CHRIS Hsu. NIKITA wrote this appointment down and provided it to the patient along with a brochure for [facility] (brochure had practice's address and phone number).

## 2018-05-10 NOTE — PROGRESS NOTES
TRANSFER - IN REPORT:    Verbal report received from [facility]) on [employer]  being received from [employer]) for routine progression of care      Report consisted of patients Situation, Background, Assessment and   Recommendations(SBAR). Information from the following report(s) SBAR, Procedure Summary, Intake/Output, MAR and Recent Results was reviewed with the receiving nurse. Opportunity for questions and clarification was provided. Assessment completed upon patients arrival to unit and care assumed.

## 2018-05-10 NOTE — ANESTHESIA PROCEDURE NOTES
Peripheral Block    Start time: 5/10/2018 7:11 AM  End time: 5/10/2018 7:15 AM  Performed by: Sudarshan Zaman  Authorized by: Sudarshan Zaman       Pre-procedure: Indications: at surgeon's request and post-op pain management    Preanesthetic Checklist: patient identified, risks and benefits discussed, site marked, timeout performed, anesthesia consent given and patient being monitored    Timeout Time: 07:11          Block Type:   Block Type:   Interscalene  Laterality:  Left  Monitoring:  Standard ASA monitoring, responsive to questions, oxygen, continuous pulse ox, frequent vital sign checks and heart rate  Injection Technique:  Single shot  Procedures: ultrasound guided    Patient Position: seated  Prep: chlorhexidine    Location:  Interscalene  Needle Type:  Stimuplex  Needle Gauge:  22 G  Needle Localization:  Ultrasound guidance  Medication Injected:  0.375%  bupivacaine  Adds:  Epi 1:400K  Volume (mL):  25  mepivacaine  Volume (mL):  10    Assessment:  Number of attempts:  1  Injection Assessment:  Incremental injection every 5 mL, negative aspiration for CSF, no paresthesia, ultrasound image on chart, no intravascular symptoms, negative aspiration for blood and local visualized surrounding nerve on ultrasound  Patient tolerance:  Patient tolerated the procedure well with no immediate complications

## 2018-05-10 NOTE — IP AVS SNAPSHOT
[address] [place] [address] 
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 
[phone] Daniel Humphrey NP On 2018 12:50 pm [address] [facility] [address] 
[phone] Surendra King MD In 2 weeks Call office if not already scheduled [address] [facility] [address] [place] [address] 16 Your Scheduled Appointments Monday May 14, 2018 10:00 AM EDT  
PT Initial Visit [place] with Susan Chairez, PT  
[facility] ([facility]) [address] [facility] [address] [phone] Please arrive 10-15 minutes prior to your appointment time. Wear comfortable clothing. Please bring your insurance cards with you. Please bring a list of your medications including herbal supplements. Please bring a list of your allergies including food allergies. Thursday May 17, 2018  1:20 PM EDT New Patient with Daniel Humphrey NP  
[address] [place] ([address]) [address] [facility] [address]  
[phone] Discharge Orders None A check adam indicates which time of day the medication should be taken. My Medications ASK your doctor about these medications Instructions Each Dose to Equal  
 Morning Noon Evening Bedtime CINNAMON 500 mg Cap Generic drug:  cinnamon bark Your next dose is:  Tomorrow Take 1 Cap by mouth daily. Per anesthesia protocol: pt instructed to stop med 7 days prior to day of surgery. 1 Cap  
    
  
   
   
   
  
 lisinopril 20 mg tablet Commonly known as:  Pecola Cypher Your next dose is:  Tomorrow Take 20 mg by mouth daily. Indications: hypertension 20 mg  
    
  
   
   
   
  
 metFORMIN 500 mg tablet Commonly known as:  GLUCOPHAGE Your next dose is: Today Take 500 mg by mouth two (2) times daily (with meals). Indications: type 2 diabetes mellitus 500 mg  
    
   
   
  
   
  
 multivitamin tablet Commonly known as:  ONE A DAY Your next dose is:  Tomorrow Take 1 Tab by mouth daily. Per anesthesia protocol: pt instructed to stop med 7 days prior to day of surgery. 1 Tab TOUJEO MAX SOLOSTAR 300 unit/mL (3 mL) Inpn Generic drug:  insulin glargine Your next dose is:  Tomorrow 35 Units by SubCUTAneous route daily. Per anesthesia protocol:instructed to take 1/2 normal dose (17 units) DOS. Indications: type 2 diabetes mellitus 35 Units Discharge Instructions DISCHARGE SUMMARY from Nurse The following personal items collected during your admission are returned to you:  
Dental Appliance: Dental Appliances: Lowers; Other (comment) (bridge ) Vision: Visual Aid: Glasses Hearing Aid:   na 
Jewelry: Jewelry: None Clothing: Clothing: At bedside Other Valuables: Other Valuables: None Valuables sent to safe:   na 
 
 
 
 
PATIENT INSTRUCTIONS: 
 
New Medications: Dilaudid 2 mg tabs Take 1-2 tabs every 4-6 hrs as needed for pain. Toradol 10 mg tabs Take 1 tab every 6 hrs x 5 days. Phenergan 25 mg tabs Take 1 tab every 8 hrs as needed for nausea. Restoril 15 mg tabs Take 1 tab at bedtime as needed for sleep. After general anesthesia or intravenous sedation, for 24 hours or while taking prescription Narcotics: · Limit your activities · Do not drive and operate hazardous machinery · Do not make important personal or business decisions · Do  not drink alcoholic beverages · If you have not urinated within 8 hours after discharge, please contact your surgeon on call. Report the following to your surgeon: 
· Excessive pain, swelling, redness or odor of or around the surgical area · Temperature over 101 · Nausea and vomiting lasting longer than 4 hours or if unable to take medications · Any signs of decreased circulation or nerve impairment to extremity: change in color, persistent  numbness, tingling, coldness or increase pain · Any questions, call office @ [phone]. What to do at Home: 
Recommended activity: activity as tolerated, as instructed per Dr. Chaim Hahn. Continue with exercises taught by Physical Therapy. Resume per hospital diet. Wear sling to left arm. Use ice and elevate arm to decrease pain and swelling. If you experience any of the following symptoms temp>101, pain unrelieved by meds, or persistent nausea or vomitting, please follow up with Dr. Chaim Hahn @ [phone]. *  Please give a list of your current medications to your Primary Care Provider. *  Please update this list whenever your medications are discontinued, doses are 
    changed, or new medications (including over-the-counter products) are added. *  Please carry medication information at all times in case of emergency situations. Shoulder Arthroscopy: What to Expect at Home Your Recovery Your arm may be in a sling. You will feel tired for several days.  Your shoulder will be swollen, and you may notice that your skin is a different color near the cuts the doctor made (incisions). Your hand and arm may also be swollen. This is normal and will go away in a few days. Depending on the medicine you had during the surgery, your entire arm may feel numb or like you cannot move it. This goes away in 12 to 24 hours. When you can return to work or your usual routine will depend on your shoulder problem. Most people need 6 weeks or longer to recover. How much time you need depends on the surgery that was done. You may have to limit your activity until your shoulder strength and range of motion return to normal. You may also be in a rehabilitation program (rehab). If you have a desk job, you may be able to return to work a few days after the surgery. If you lift things at work, it may take months before you return to work. This care sheet gives you a general idea about how long it will take for you to recover. But each person recovers at a different pace. Follow the steps below to get better as quickly as possible. How can you care for yourself at home? Activity · Rest when you feel tired. Getting enough sleep will help you recover. You may be more comfortable if you sleep in a reclining chair. To make your arm and shoulder feel better, keep a thin pillow under the back of your arm while you are lying down. · Try to walk each day. Start by walking a little more than you did the day before. Bit by bit, increase the amount you walk. Walking boosts blood flow and helps prevent pneumonia and constipation. · For 2 to 3 weeks, avoid lifting anything heavier than a plate or a glass. You need to give your shoulder time to heal. 
· Your arm may be in a sling. You may need to use the sling for a few days to a few weeks. Your doctor will advise you on how long to wear the sling. · You may take the sling off when you dress or wash. · Do not use your arm for repeated movements. These include painting, vacuuming, or using a computer. Diet · You can eat your normal diet. If your stomach is upset, try bland, low-fat foods like plain rice, broiled chicken, toast, and yogurt. · Drink plenty of fluids, unless your doctor tells you not to. · You may notice that your bowel movements are not regular right after your surgery. This is common. Try to avoid constipation and straining with bowel movements. You may want to take a fiber supplement every day. If you have not had a bowel movement after a couple of days, ask your doctor about taking a mild laxative. Medicines · Take pain medicines exactly as directed. ¨ If the doctor gave you a prescription medicine for pain, take it as prescribed. ¨ If you are not taking a prescription pain medicine, ask your doctor if you can take an over-the-counter medicine. · If you think your pain medicine is making you sick to your stomach: 
¨ Take your medicine after meals (unless your doctor has told you not to). ¨ Ask your doctor for a different pain medicine. · If your doctor prescribed antibiotics, take them as directed. Do not stop taking them just because you feel better. You need to take the full course of antibiotics. Incision care · If you have a dressing over your incision, keep it clean and dry. You may remove it 2 to 3 days after the surgery. · If your incision is open to the air, keep the area clean and dry. · If you have strips of tape on the incision, leave the tape on for a week or until it falls off. Exercise · You may need rehabilitation. This is a series of exercises you do after your surgery. Rehab helps you get back your shoulder's range of motion and strength. You will work with your doctor and physical therapist to plan this exercise program. To get the best results, you need to do the exercises correctly and as often and as long as your doctor tells you.  
Ice 
 · To reduce swelling and pain, put ice or a cold pack on your shoulder for 10 to 20 minutes at a time. Do this every 1 to 2 hours. Put a thin cloth between the ice and your skin. Follow-up care is a key part of your treatment and safety. Be sure to make and go to all appointments, and call your doctor if you are having problems. It's also a good idea to know your test results and keep a list of the medicines you take. When should you call for help? Call 911 anytime you think you may need emergency care. For example, call if: 
· You passed out (lost consciousness). · You have severe trouble breathing. · You have sudden chest pain and shortness of breath, or you cough up blood. Call your doctor now or seek immediate medical care if: 
· Your hand is cool, pale, or numb, or it changes color. · You are unable to move your fingers, wrist, or elbow. · You are sick to your stomach or cannot keep fluids down. · You have pain that does not get better after you take pain medicine. · You have signs of infection, such as: 
¨ Increased pain, swelling, warmth, or redness. ¨ Red streaks leading from the incision. ¨ Pus draining from the incision. ¨ A fever. · You have loose stitches, or your incision comes open. · Your incision bleeds through your first dressing or is still bleeding 3 days after your surgery. Watch closely for changes in your health, and be sure to contact your doctor if: 
· Your sling feels too tight, and you cannot loosen it. · You have new or increased swelling in your arm. · You have new pain that develops in another area of the affected limb. For example, you have pain in your hand or elbow. · You do not have a bowel movement after taking a laxative. · You do not get better as expected. Where can you learn more? Go to [employer]. TrailMaps.be Enter V812 in the search box to learn more about \"Shoulder Arthroscopy: What to Expect at Home. \"  
 © 5961-3527 Healthwise, [employer]. Care instructions adapted under license by Caity Flores (which disclaims liability or warranty for this information). This care instruction is for use with your licensed healthcare professional. If you have questions about a medical condition or this instruction, always ask your healthcare professional. [address] any warranty or liability for your use of this information. Content Version: 12.9.335986; Current as of: June 4, 2014 These are general instructions for a healthy lifestyle: No smoking/ No tobacco products/ Avoid exposure to second hand smoke Surgeon General's Warning:  Quitting smoking now greatly reduces serious risk to your health. Obesity, smoking, and sedentary lifestyle greatly increases your risk for illness A healthy diet, regular physical exercise & weight monitoring are important for maintaining a healthy lifestyle You may be retaining fluid if you have a history of heart failure or if you experience any of the following symptoms:  Weight gain of 3 pounds or more overnight or 5 pounds in a week, increased swelling in our hands or feet or shortness of breath while lying flat in bed. Please call your doctor as soon as you notice any of these symptoms; do not wait until your next office visit. Recognize signs and symptoms of STROKE: 
 
F-face looks uneven A-arms unable to move or move unevenly S-speech slurred or non-existent T-time-call 911 as soon as signs and symptoms begin-DO NOT go Back to bed or wait to see if you get better-TIME IS BRAIN. The discharge information has been reviewed with the patient. The patient verbalized understanding. Profitablyhart Announcement We are excited to announce that we are making your provider's discharge notes available to you in Profitablyhart.   You will see these notes when they are completed and signed by the physician that discharged you from your recent hospital stay. If you have any questions or concerns about any information you see in NanoViricides, please call the Health Information Department where you were seen or reach out to your Primary Care Provider for more information about your plan of care. Introducing [facility]! [employer] introduces [employer] patient portal. Now you can access parts of your medical record, email your doctor's office, and request medication refills online. 1. In your internet browser, go to [email or web address] 2. Click on the First Time User? Click Here link in the Sign In box. You will see the New Member Sign Up page. 3. Enter your [employer] Access Code exactly as it appears below. You will not need to use this code after youve completed the sign-up process. If you do not sign up before the expiration date, you must request a new code. · [employer] Access Code: [email or web address] Expires: 7/30/2018 11:19 AM 
 
4. Enter the last four digits of your Social Security Number (xxxx) and Date of Birth (mm/dd/yyyy) as indicated and click Submit. You will be taken to the next sign-up page. 5. Create a NanoViricides ID. This will be your [employer] login ID and cannot be changed, so think of one that is secure and easy to remember. 6. Create a [employer] password. You can change your password at any time. 7. Enter your Password Reset Question and Answer. This can be used at a later time if you forget your password. 8. Enter your e-mail address. You will receive e-mail notification when new information is available in [address] 9. Click Sign Up. You can now view and download portions of your medical record. 10. Click the Download Summary menu link to download a portable copy of your medical information.  
 
If you have questions, please visit the Frequently Asked Questions section of the Modern Message. Remember, MyChart is NOT to be used for urgent needs. For medical emergencies, dial 911. Now available from your iPhone and Android! Introducing Amado Ann As a [facility] patient, I wanted to make you aware of our electronic visit tool called Amado Ann. [employer] allows you to connect within minutes with a medical provider 24 hours a day, seven days a week via a mobile device or tablet or logging into a secure website from your computer. You can access Amado Ann from anywhere in the [place]. A virtual visit might be right for you when you have a simple condition and feel like you just dont want to get out of bed, or cant get away from work for an appointment, when your regular [facility] provider is not available (evenings, weekends or holidays), or when youre out of town and need minor care. Electronic visits cost only $49 and if the [facility] provider determines a prescription is needed to treat your condition, one can be electronically transmitted to a nearby pharmacy*. Please take a moment to enroll today if you have not already done so. The enrollment process is free and takes just a few minutes. To enroll, please download the [employer] yoana to your tablet or phone, or visit [email or web address] to enroll on your computer. And, as an [address] [place] [address] patient with a [employer] account, the results of your visits will be scanned into your electronic medical record and your primary care provider will be able to view the scanned results. We urge you to continue to see your regular [facility] provider for your ongoing medical care. And while your primary care provider may not be the one available when you seek a Amado Ann virtual visit, the peace of mind you get from getting a real diagnosis real time can be priceless. For more information on Amado Ann, view our Frequently Asked Questions (FAQs) at [email or web address]. Sincerely, 
 
Carmen Linda MD 
Chief Medical Officer [address] *:  certain medications cannot be prescribed via Amado Ann Providers Seen During Your Hospitalization Provider Specialty Primary office phone Adolfo Epps MD Orthopedic Surgery [phone] Your Primary Care Physician (PCP) Primary Care Physician Office Phone Office Fax Marilyn Marieiz [phone] You are allergic to the following No active allergies Recent Documentation Height Weight BMI Smoking Status 1.702 m 105.5 kg 36.41 kg/m2 Never Smoker Emergency Contacts Name Discharge Info Relation Home Work Mobile [employer]  [employer] [2] [phone] Patient Belongings The following personal items are in your possession at time of discharge: 
  Dental Appliances: None  Visual Aid: Glasses          Jewelry: None Please provide this summary of care documentation to your next provider. Signatures-by signing, you are acknowledging that this After Visit Summary has been reviewed with you and you have received a copy. Patient Signature:  ____________________________________________________________ Date:  ____________________________________________________________  
  
[facility] Provider Signature:  ____________________________________________________________ Date:  ____________________________________________________________

## 2018-05-10 NOTE — H&P
Date of Surgery Update: Breezy Parry was seen and examined. History and physical has been reviewed. The patient has been examined.  There have been no significant clinical changes since the completion of the originally dated History and Physical.    Signed By: Nuno Spencer MD     May 10, 2018 6:33 AM

## 2018-05-10 NOTE — ANESTHESIA PREPROCEDURE EVALUATION
Anesthetic History   No history of anesthetic complications            Review of Systems / Medical History  Patient summary reviewed, nursing notes reviewed and pertinent labs reviewed    Pulmonary  Within defined limits                 Neuro/Psych   Within defined limits           Cardiovascular    Hypertension: well controlled              Exercise tolerance: >4 METS     GI/Hepatic/Renal                Endo/Other    Diabetes: well controlled, type 2  Hyperthyroidism  Arthritis     Other Findings              Physical Exam    Airway  Mallampati: II  TM Distance: 4 - 6 cm  Neck ROM: normal range of motion   Mouth opening: Normal     Cardiovascular    Rhythm: regular           Dental  No notable dental hx       Pulmonary  Breath sounds clear to auscultation               Abdominal         Other Findings            Anesthetic Plan    ASA: 3  Anesthesia type: general      Post-op pain plan if not by surgeon: peripheral nerve block single    Induction: Intravenous  Anesthetic plan and risks discussed with: Patient

## 2018-05-11 VITALS
OXYGEN SATURATION: 96 % | BODY MASS INDEX: 36.49 KG/M2 | WEIGHT: 232.5 LBS | TEMPERATURE: 98.7 F | SYSTOLIC BLOOD PRESSURE: 149 MMHG | HEIGHT: 67 IN | DIASTOLIC BLOOD PRESSURE: 94 MMHG | HEART RATE: 97 BPM | RESPIRATION RATE: 18 BRPM

## 2018-05-11 PROBLEM — E83.42 HYPOMAGNESEMIA: Status: ACTIVE | Noted: 2018-05-11

## 2018-05-11 LAB
ANION GAP SERPL CALC-SCNC: 11 MMOL/L (ref 7–16)
BUN SERPL-MCNC: 13 MG/DL (ref 6–23)
CALCIUM SERPL-MCNC: 8.2 MG/DL (ref 8.3–10.4)
CHLORIDE SERPL-SCNC: 98 MMOL/L (ref 98–107)
CO2 SERPL-SCNC: 26 MMOL/L (ref 21–32)
CREAT SERPL-MCNC: 0.96 MG/DL (ref 0.8–1.5)
GLUCOSE BLD STRIP.AUTO-MCNC: 334 MG/DL (ref 65–100)
GLUCOSE BLD STRIP.AUTO-MCNC: 357 MG/DL (ref 65–100)
GLUCOSE SERPL-MCNC: 365 MG/DL (ref 65–100)
MAGNESIUM SERPL-MCNC: 1.5 MG/DL (ref 1.8–2.4)
POTASSIUM SERPL-SCNC: 4.1 MMOL/L (ref 3.5–5.1)
SODIUM SERPL-SCNC: 135 MMOL/L (ref 136–145)

## 2018-05-11 PROCEDURE — 74011250637 HC RX REV CODE- 250/637: Performed by: INTERNAL MEDICINE

## 2018-05-11 PROCEDURE — 97161 PT EVAL LOW COMPLEX 20 MIN: CPT

## 2018-05-11 PROCEDURE — 74011250637 HC RX REV CODE- 250/637: Performed by: ORTHOPAEDIC SURGERY

## 2018-05-11 PROCEDURE — 82962 GLUCOSE BLOOD TEST: CPT

## 2018-05-11 PROCEDURE — 80048 BASIC METABOLIC PNL TOTAL CA: CPT | Performed by: ORTHOPAEDIC SURGERY

## 2018-05-11 PROCEDURE — 83735 ASSAY OF MAGNESIUM: CPT | Performed by: ORTHOPAEDIC SURGERY

## 2018-05-11 PROCEDURE — 74011000258 HC RX REV CODE- 258: Performed by: ORTHOPAEDIC SURGERY

## 2018-05-11 PROCEDURE — 99218 HC RM OBSERVATION: CPT

## 2018-05-11 PROCEDURE — 36415 COLL VENOUS BLD VENIPUNCTURE: CPT | Performed by: ORTHOPAEDIC SURGERY

## 2018-05-11 PROCEDURE — 74011636637 HC RX REV CODE- 636/637: Performed by: INTERNAL MEDICINE

## 2018-05-11 PROCEDURE — 97110 THERAPEUTIC EXERCISES: CPT

## 2018-05-11 PROCEDURE — 74011250636 HC RX REV CODE- 250/636: Performed by: ORTHOPAEDIC SURGERY

## 2018-05-11 RX ORDER — MAGNESIUM SULFATE HEPTAHYDRATE 40 MG/ML
4 INJECTION, SOLUTION INTRAVENOUS ONCE
Status: COMPLETED | OUTPATIENT
Start: 2018-05-11 | End: 2018-05-11

## 2018-05-11 RX ADMIN — INSULIN LISPRO 8 UNITS: 100 INJECTION, SOLUTION INTRAVENOUS; SUBCUTANEOUS at 11:46

## 2018-05-11 RX ADMIN — HYDROMORPHONE HYDROCHLORIDE 1 MG: 2 INJECTION, SOLUTION INTRAMUSCULAR; INTRAVENOUS; SUBCUTANEOUS at 09:25

## 2018-05-11 RX ADMIN — INSULIN GLARGINE 28 UNITS: 100 INJECTION, SOLUTION SUBCUTANEOUS at 08:30

## 2018-05-11 RX ADMIN — MAGNESIUM SULFATE HEPTAHYDRATE 4 G: 40 INJECTION, SOLUTION INTRAVENOUS at 07:00

## 2018-05-11 RX ADMIN — CEFAZOLIN SODIUM 1 G: 1 INJECTION, POWDER, FOR SOLUTION INTRAMUSCULAR; INTRAVENOUS at 01:09

## 2018-05-11 RX ADMIN — CEFAZOLIN SODIUM 1 G: 1 INJECTION, POWDER, FOR SOLUTION INTRAMUSCULAR; INTRAVENOUS at 09:27

## 2018-05-11 RX ADMIN — HYDROMORPHONE HYDROCHLORIDE 4 MG: 4 TABLET ORAL at 01:09

## 2018-05-11 RX ADMIN — INSULIN LISPRO 10 UNITS: 100 INJECTION, SOLUTION INTRAVENOUS; SUBCUTANEOUS at 08:30

## 2018-05-11 RX ADMIN — HYDROMORPHONE HYDROCHLORIDE 4 MG: 4 TABLET ORAL at 06:59

## 2018-05-11 RX ADMIN — HYDROMORPHONE HYDROCHLORIDE 1 MG: 2 INJECTION, SOLUTION INTRAMUSCULAR; INTRAVENOUS; SUBCUTANEOUS at 03:18

## 2018-05-11 RX ADMIN — LISINOPRIL 20 MG: 20 TABLET ORAL at 08:31

## 2018-05-11 RX ADMIN — DOCUSATE SODIUM 100 MG: 100 CAPSULE, LIQUID FILLED ORAL at 08:32

## 2018-05-11 RX ADMIN — METFORMIN HYDROCHLORIDE 500 MG: 500 TABLET, FILM COATED ORAL at 08:32

## 2018-05-11 RX ADMIN — HYDROMORPHONE HYDROCHLORIDE 4 MG: 4 TABLET ORAL at 11:28

## 2018-05-11 NOTE — PROGRESS NOTES
Orthopedic Joint Progress Note    May 11, 2018  Admit Date: 5/10/2018  Admit Diagnosis: Strain of muscle(s) and tendon(s) of the rotator cuff of left shoulder, initial encounter [S46.012A]  Strain of muscle, fascia and tendon of long head of biceps, left arm, initial encounter [S46.112A]  Superior glenoid labrum lesion of left shoulder, initial encounter [D01.710P]  Primary osteoarthritis, left shoulder [M19.012]    1 Day Post-Op    Subjective: no complaints hospitalist input appreciated     Ann Walsh     Review of Systems: Pertinent items are noted in HPI. Objective:     PT/OT:     PATIENT MOBILITY                           Vital Signs:    Blood pressure 125/83, pulse (!) 103, temperature 98.7 °F (37.1 °C), resp. rate 18, height 5' 7\" (1.702 m), weight 105.5 kg (232 lb 8 oz), SpO2 96 %.   Temp (24hrs), Av.5 °F (36.9 °C), Min:98.2 °F (36.8 °C), Max:98.8 °F (37.1 °C)      Pain Control:   Pain Assessment  Pain Scale 1: Numeric (0 - 10)  Pain Intensity 1: 9  Pain Onset 1: weeks  Pain Location 1: Shoulder  Pain Orientation 1: Left  Pain Description 1: Aching, Constant  Pain Intervention(s) 1: Medication (see MAR)    Meds:  Current Facility-Administered Medications   Medication Dose Route Frequency    magnesium sulfate 4 g/100 mL IVPB  4 g IntraVENous ONCE    0.9% sodium chloride infusion  75 mL/hr IntraVENous CONTINUOUS    sodium chloride (NS) flush 5-10 mL  5-10 mL IntraVENous Q8H    sodium chloride (NS) flush 5-10 mL  5-10 mL IntraVENous PRN    bisacodyl (DULCOLAX) suppository 10 mg  10 mg Rectal DAILY PRN    docusate sodium (COLACE) capsule 100 mg  100 mg Oral BID    promethazine (PHENERGAN) tablet 25 mg  25 mg Oral Q4H PRN    temazepam (RESTORIL) capsule 15 mg  15 mg Oral QHS PRN    HYDROmorphone (DILAUDID) tablet 4 mg  4 mg Oral Q3H PRN    HYDROmorphone (DILAUDID) tablet 2 mg  2 mg Oral Q3H PRN    HYDROmorphone (PF) (DILAUDID) injection 1 mg  1 mg IntraVENous Q1H PRN    ceFAZolin (ANCEF) 1 g in 0.9% sodium chloride (MBP/ADV) 50 mL  1 g IntraVENous Q8H    lisinopril (PRINIVIL, ZESTRIL) tablet 20 mg  20 mg Oral DAILY    insulin lispro (HUMALOG) injection   SubCUTAneous AC&HS    polyethylene glycol (MIRALAX) packet 17 g  17 g Oral BID PRN    insulin glargine (LANTUS) injection 28 Units  28 Units SubCUTAneous DAILY    metFORMIN (GLUCOPHAGE) tablet 500 mg  500 mg Oral BID WITH MEALS        LAB:    No results found for: INR  No results found for: HGB, HGBEXT, HGBEXT, HGBEXT    Wound Shoulder Left (Active)   DRESSING STATUS Clean, dry, and intact 5/10/2018  8:00 PM   DRESSING TYPE Dry dressing;Foam;Ice wound dressing/Cryotherapy 5/10/2018  8:00 PM   SPLINT TYPE/MATERIAL Sling; Shoulder Immobilizer 5/10/2018  8:00 PM   Number of days:1             Physical Exam:  No significant changes    Assessment:      Principal Problem:    Superior glenoid labrum lesion of left shoulder (5/5/2018)    Active Problems:    Strain of muscle, fascia and tendon of long head of biceps, left arm, initial encounter (5/5/2018)      Degenerative joint disease of left acromioclavicular joint (5/5/2018)      Uncontrolled type II diabetes mellitus ([address] (5/10/2018)      HTN (hypertension) (5/10/2018)      Hypomagnesemia (5/11/2018)         Plan:     Continue PT/OT/Rehab  Replete magnesium  Discharge home    Patient Expects to be Discharged to[de-identified] Unknown     Signed By: Edmund Kingston MD

## 2018-05-11 NOTE — DIABETES MGMT
Patient s/p left shoulder arthroscopy, seen by diabetes educator. Patient states he has a positive family history of diabetes and he was diagnosed about ten years ago. Patient states he is compliant with his regimen of metformin 500 mg BID, and toujeo 35 units every day. Patient states he sometimes ends up taking all of his metformin in the evening if he forgets his morning dose. Patient reports he checks his blood glucose every other day and while it has been \"as low as 150 it's usually 150-199, but has been as high as 350. \" Patient states he has been to diabetic classes in the past. Patient states he got tired of getting his diabetes managed at the [place] because they \"don't try any of the newer fancier stuff on you, just metformin and insulin. \" HgA1c 10.2 (eA). Discussed the significance of this number and goals for A1c and blood glucose. Patient states, \"That's not as bad as I thought. It has been up to 13.\" Patient states he has been having a hard time finding a PCP that is taking new patients, but case management has arranged that for him. Patient states he is on a list to see an endocrinologist in about eight months. Patient states he is comfortable with using both insulin pens and vials and syringes. Blood glucose 341 on admission. Blood glucose ranged 236-341 yesterday with patient receiving bolus insulin 22 units and metformin 500 mg. Blood glucose 365 this morning. Educated pt re: current basal/bolus regimen of Lantus 28 units QD and Humalog SSI including type of insulin, timing with meals, onset, duration of effect, and peak of insulin dose. Pt verbalizes understanding. Pt given educational material, \"Diabetes Self-Management: A Patient Teaching Guide\", but patient was not interested in receiving diabetic education. \"I know what I need to do, I just having been dong it. \" Encouraged patient to be compliant with regimen, check blood glucose more frequently in order to give his new PCP more data to work with to titrate his regimen, and to work with new PCP to find a regimen that will get his blood glucose readings at goal. Reinforced the complications of uncontrolled diabetes with patient. Patient verbalized understanding and voices no questions at this time.

## 2018-05-11 NOTE — PROGRESS NOTES
Discharge instructions given and prescriptions reviewed and given to patient. Opportunity for questions and clarification provided. Patient verbalizes understanding. Patient discharged in stable condition to car via wheelchair.

## 2018-05-11 NOTE — PROGRESS NOTES
Problem: Mobility Impaired (Adult and Pediatric)  Goal: *Acute Goals and Plan of Care (Insert Text)  Pt. Will be independent with HEP. Goal met    PHYSICAL THERAPY: Initial Assessment, Discharge, Treatment Day: Day of Assessment, AM 5/11/2018  OBSERVATION: Hospital Day: 2  Payor: [address] [place] [address] Blvd / Plan: [id number] [facility] / Product Type: Workers Comp /      NAME/AGE/GENDER: Valentina Bernstein is a 37 y.o. male   PRIMARY DIAGNOSIS: Strain of muscle(s) and tendon(s) of the rotator cuff of left shoulder, initial encounter [S46.012A]  Strain of muscle, fascia and tendon of long head of biceps, left arm, initial encounter [S46.112A]  Superior glenoid labrum lesion of left shoulder, initial encounter [S89.339Q]  Primary osteoarthritis, left shoulder [M19.012] Superior glenoid labrum lesion of left shoulder Superior glenoid labrum lesion of left shoulder  Procedure(s) (LRB):             ARTHROSCOPY LEFT SHOULDER ARTHROSCOPIC SUBACROMIAL DECOMPRESSION, DISTAL CLAVICLE RESECTION, EXTENSIVE DEBRIDEMENT SLAP TEAR, GLENOHUMERAL JOINT, SUBACROMIAL SPACE, MINI BICEPS TENODESIS  (Left)  1 Day Post-Op  ICD-10: Treatment Diagnosis:   · Pain in Left Shoulder (M25.512)  · Stiffness of Left Shoulder, Not elsewhere classified (M25.612)     Precaution/Allergies:  Review of patient's allergies indicates no known allergies. ASSESSMENT:     Mr. aMddie Callejas presents with limited rom and strength of left UE s/p ARTHROSCOPY LEFT SHOULDER ARTHROSCOPIC SUBACROMIAL DECOMPRESSION, DISTAL CLAVICLE RESECTION, EXTENSIVE DEBRIDEMENT SLAP TEAR, GLENOHUMERAL JOINT, SUBACROMIAL SPACE, MINI BICEPS TENODESIS  (Left)  Pt. And spouse Instructed in great detail in safety and not using left shoulder or weight on left UE. Stressed no shoulder motion. He was instructed and performed HEP for left elbow and hand and gentle pendulums. Issued written sheet. Long explanation of cryocuff and ultrasling and fitted with ultrasling.   Pt. Up in room independently and took a shower on his own. No questions or concerns about going home. He has OPPT appointment. Comments: Active and passive range of motion LEFT elbow hand   GENTLE pendulums   NO OTHER SHOULDER MOTION   nwb ue LEFT   wbat ble   Ultra sling shoulder LEFT   Question: Reason for PT? Answer: S/P BICEPS TENODESIS LEFT SHOULDER       This section established at most recent assessment   PROBLEM LIST (Impairments causing functional limitations):  1. Decreased [place] with Bed Mobility  2. Decreased [place] with Transfers  3. Decreased [place] with Ambulation   4. Decreased [place] with shoulder HEP   INTERVENTIONS PLANNED: (Benefits and precautions of physical therapy have been discussed with the patient.)  1. Bed Mobility Training  2. Transfer Training  3. Gait Training  4. Therapeutic Exercises per MD orders  5. Modalities for Pain     TREATMENT PLAN: Frequency/Duration: pt. Going home soon  Rehabilitation Potential For Stated Goals: Good     RECOMMENDED REHABILITATION/EQUIPMENT: (at time of discharge pending progress): Continue Skilled Therapy and Outpatient: Physical Therapy. HISTORY:   History of Present Injury/Illness (Reason for Referral):  S/p left shoulder surgery  Past Medical History/Comorbidities:   Mr. Juvencio Pitts  has a past medical history of Degenerative joint disease of left acromioclavicular joint (5/5/2018); Diabetes ([address] (2008); Hypertension; Strain of muscle(s) and tendon(s) of the rotator cuff of left shoulder, initial encounter; and Strain of muscle, fascia and tendon of long head of biceps, left arm, initial encounter. He also has no past medical history of Aneurysm ([address] Arrhythmia; Asthma; Autoimmune disease ([address] CAD (coronary artery disease); Cancer ([address] Chronic kidney disease; Chronic obstructive pulmonary disease ([address] Chronic pain; Coagulation disorder ([address]  Difficult intubation; Endocarditis; GERD (gastroesophageal reflux disease); Heart failure ([facility] Ill-defined condition; Liver disease; Malignant hyperthermia due to anesthesia; Morbid obesity ([facility] Nausea & vomiting; Nicotine vapor product user; Non-nicotine vapor product user; Pseudocholinesterase deficiency; Psychiatric disorder; PUD (peptic ulcer disease); Rheumatic fever; Seizures ([facility] Sleep apnea; Stroke [facility]); Thromboembolus ([facility] or Thyroid disease. Mr. Maddie Callejas  has a past surgical history that includes hx hernia repair and hx acl reconstruction. Social History/Living Environment:   Home Environment: Private residence  One/Two Story Residence: One story  Living Alone: No  Support Systems: Family member(s)  Patient Expects to be Discharged to[de-identified] Unknown  Current DME Used/Available at Home: None  Prior Level of Function/Work/Activity:  independent   Number of Personal Factors/Comorbidities that affect the Plan of Care: 1-2: MODERATE COMPLEXITY   EXAMINATION:   Most Recent Physical Functioning:   Gross Assessment:  AROM: Within functional limits (LE's and right UE)  Strength: Within functional limits (LE's and right UE)    LUE AROM  L Elbow Flexion: 110 (aarom)  L Elbow Extension: 20 (aarom)  L Wrist Flexion:  (wfl)  L Wrist Extension:  (wfl)  Left Hand Grasp: Yes          Posture:     Balance:  Sitting: Intact  Standing: Intact Bed Mobility:  Supine to Sit: Independent  Sit to Supine: Independent  Wheelchair Mobility:     Transfers:  Sit to Stand: Independent  Stand to Sit: Independent  Gait:     Distance (ft): 20 Feet (ft) (in room)  Ambulation - Level of Assistance: Independent      Body Structures Involved:  1. Bones  2. Joints  3. Muscles  4. Ligaments Body Functions Affected:  1. Movement Related Activities and Participation Affected:  1.  Mobility   Number of elements that affect the Plan of Care: 1-2: LOW COMPLEXITY   CLINICAL PRESENTATION:   Presentation: Stable and uncomplicated: LOW COMPLEXITY   CLINICAL DECISION MAKIN [place] Mobility Inpatient Short Form  How much difficulty does the patient currently have. .. Unable A Lot A Little None   1. Turning over in bed (including adjusting bedclothes, sheets and blankets)? [] 1   [] 2   [] 3   [x] 4   2. Sitting down on and standing up from a chair with arms ( e.g., wheelchair, bedside commode, etc.)   [] 1   [] 2   [] 3   [x] 4   3. Moving from lying on back to sitting on the side of the bed? [] 1   [] 2   [] 3   [x] 4   How much help from another person does the patient currently need. .. Total A Lot A Little None   4. Moving to and from a bed to a chair (including a wheelchair)? [] 1   [] 2   [] 3   [x] 4   5. Need to walk in hospital room? [] 1   [] 2   [] 3   [x] 4   6. Climbing 3-5 steps with a railing? [] 1   [] 2   [] 3   [x] 4   © 2007, Trustees of [address] [place] [address], under license to [employer]. All rights reserved      Score:  Initial: 24 Most Recent: X (Date: -- )    Interpretation of Tool:  Represents activities that are increasingly more difficult (i.e. Bed mobility, Transfers, Gait). Score 24 23 22-20 19-15 14-10 9-7 6     Modifier CH CI CJ CK CL CM CN      ? Mobility - Walking and Moving Around:     - CURRENT STATUS: CH - 0% impaired, limited or restricted    - GOAL STATUS: CH - 0% impaired, limited or restricted    - D/C STATUS:  CH - 0% impaired, limited or restricted  Payor: [address] [place], [place] [address]vd / Plan: [id number] [facility] / Product Type: Workers Comp /      Medical Necessity:     · pt. going home from hospital today. Reason for Services/Other Comments:  · pt. going home from hospital today.    Use of outcome tool(s) and clinical judgement create a POC that gives a: Clear prediction of patient's progress: LOW COMPLEXITY            TREATMENT:   (In addition to Assessment/Re-Assessment sessions the following treatments were rendered)   Pre-treatment Symptoms/Complaints:  shoulder  Pain: Initial:      Post Session:  \"a lot\" Not rated     Therapeutic Exercise: (10 Minutes):  Exercises per grid below to improve mobility and strength. Required minimal visual, verbal and manual cues to promote proper body alignment, promote proper body posture and promote proper body mechanics. Progressed range and repetitions as indicated. Date:  5/11 Date:   Date:     ACTIVITY/EXERCISE AM PM AM PM AM PM   Gripping 10        Wrist Flexion/Extension 10        Wrist Ulnar/Radial Deviation         Pronation/Supination 10        Elbow Flexion/Extension 10aaarom        Shoulder Flexion/Extension         Shoulder AB/ADduction         Shoulder IR/ER         Pulleys         Pendulums Did not want to try, knows how to do        Shrugs         Isometric:                 Flexion         Extension         ABduction         ADduction         Biceps/Triceps                  B = bilateral; AA = active assistive; A = active; P = passive  Education:  [x]  Home Exercises  [x]  Sling Application   [x]  Movement Precautions   []  Pulleys   [x]  Use of Ice   []  Other:   Treatment/Session Assessment:    · Response to Treatment:  Pt. Did fine  · Interdisciplinary Collaboration:   o Registered Nurse  · After treatment position/precautions:   o Supine in bed  o Bed/Chair-wheels locked  o Bed in low position  o Caregiver at bedside  o Call light within reach  o Family at bedside   · Compliance with Program/Exercises: compliant most of the time.   · Recommendations/Intent for next treatment session:  pt. going home from hospital today  Total Treatment Duration:  PT Patient Time In/Time Out  Time In: 1040  Time Out: Brennon 27, PT

## 2018-05-11 NOTE — DISCHARGE INSTRUCTIONS
DISCHARGE SUMMARY from Nurse    The following personal items collected during your admission are returned to you:   Dental Appliance: Dental Appliances: Lowers; Other (comment) (bridge )  Vision: Visual Aid: Glasses  Hearing Aid:   na  Jewelry: Jewelry: None  Clothing: Clothing: At bedside  Other Valuables: Other Valuables: None  Valuables sent to safe:   na          PATIENT INSTRUCTIONS:    New Medications:    Dilaudid 2 mg tabs Take 1-2 tabs every 4-6 hrs as needed for pain. Toradol 10 mg tabs Take 1 tab every 6 hrs x 5 days. Phenergan 25 mg tabs Take 1 tab every 8 hrs as needed for nausea. Restoril 15 mg tabs Take 1 tab at bedtime as needed for sleep. After general anesthesia or intravenous sedation, for 24 hours or while taking prescription Narcotics:  · Limit your activities  · Do not drive and operate hazardous machinery  · Do not make important personal or business decisions  · Do  not drink alcoholic beverages  · If you have not urinated within 8 hours after discharge, please contact your surgeon on call. Report the following to your surgeon:  · Excessive pain, swelling, redness or odor of or around the surgical area  · Temperature over 101  · Nausea and vomiting lasting longer than 4 hours or if unable to take medications  · Any signs of decreased circulation or nerve impairment to extremity: change in color, persistent  numbness, tingling, coldness or increase pain  · Any questions, call office @ [phone]. What to do at Home:  Recommended activity: activity as tolerated, as instructed per Dr. Bob Mahan. Continue with exercises taught by Physical Therapy. Resume per hospital diet. Wear sling to left arm. Use ice and elevate arm to decrease pain and swelling. If you experience any of the following symptoms temp>101, pain unrelieved by meds, or persistent nausea or vomitting, please follow up with Dr. Bob Mahan @ [phone].       *  Please give a list of your current medications to your Primary Care Provider. *  Please update this list whenever your medications are discontinued, doses are      changed, or new medications (including over-the-counter products) are added. *  Please carry medication information at all times in case of emergency situations. Shoulder Arthroscopy: What to Expect at Home  Your Recovery     Your arm may be in a sling. You will feel tired for several days. Your shoulder will be swollen, and you may notice that your skin is a different color near the cuts the doctor made (incisions). Your hand and arm may also be swollen. This is normal and will go away in a few days. Depending on the medicine you had during the surgery, your entire arm may feel numb or like you cannot move it. This goes away in 12 to 24 hours. When you can return to work or your usual routine will depend on your shoulder problem. Most people need 6 weeks or longer to recover. How much time you need depends on the surgery that was done. You may have to limit your activity until your shoulder strength and range of motion return to normal. You may also be in a rehabilitation program (rehab). If you have a desk job, you may be able to return to work a few days after the surgery. If you lift things at work, it may take months before you return to work. This care sheet gives you a general idea about how long it will take for you to recover. But each person recovers at a different pace. Follow the steps below to get better as quickly as possible. How can you care for yourself at home? Activity  · Rest when you feel tired. Getting enough sleep will help you recover. You may be more comfortable if you sleep in a reclining chair. To make your arm and shoulder feel better, keep a thin pillow under the back of your arm while you are lying down. · Try to walk each day. Start by walking a little more than you did the day before. Bit by bit, increase the amount you walk.  Walking boosts blood flow and helps prevent pneumonia and constipation. · For 2 to 3 weeks, avoid lifting anything heavier than a plate or a glass. You need to give your shoulder time to heal.  · Your arm may be in a sling. You may need to use the sling for a few days to a few weeks. Your doctor will advise you on how long to wear the sling. · You may take the sling off when you dress or wash. · Do not use your arm for repeated movements. These include painting, vacuuming, or using a computer. Diet  · You can eat your normal diet. If your stomach is upset, try bland, low-fat foods like plain rice, broiled chicken, toast, and yogurt. · Drink plenty of fluids, unless your doctor tells you not to. · You may notice that your bowel movements are not regular right after your surgery. This is common. Try to avoid constipation and straining with bowel movements. You may want to take a fiber supplement every day. If you have not had a bowel movement after a couple of days, ask your doctor about taking a mild laxative. Medicines  · Take pain medicines exactly as directed. ¨ If the doctor gave you a prescription medicine for pain, take it as prescribed. ¨ If you are not taking a prescription pain medicine, ask your doctor if you can take an over-the-counter medicine. · If you think your pain medicine is making you sick to your stomach:  ¨ Take your medicine after meals (unless your doctor has told you not to). ¨ Ask your doctor for a different pain medicine. · If your doctor prescribed antibiotics, take them as directed. Do not stop taking them just because you feel better. You need to take the full course of antibiotics. Incision care  · If you have a dressing over your incision, keep it clean and dry. You may remove it 2 to 3 days after the surgery. · If your incision is open to the air, keep the area clean and dry. · If you have strips of tape on the incision, leave the tape on for a week or until it falls off. Exercise  · You may need rehabilitation. This is a series of exercises you do after your surgery. Rehab helps you get back your shoulder's range of motion and strength. You will work with your doctor and physical therapist to plan this exercise program. To get the best results, you need to do the exercises correctly and as often and as long as your doctor tells you. Ice  · To reduce swelling and pain, put ice or a cold pack on your shoulder for 10 to 20 minutes at a time. Do this every 1 to 2 hours. Put a thin cloth between the ice and your skin. Follow-up care is a key part of your treatment and safety. Be sure to make and go to all appointments, and call your doctor if you are having problems. It's also a good idea to know your test results and keep a list of the medicines you take. When should you call for help? Call 911 anytime you think you may need emergency care. For example, call if:  · You passed out (lost consciousness). · You have severe trouble breathing. · You have sudden chest pain and shortness of breath, or you cough up blood. Call your doctor now or seek immediate medical care if:  · Your hand is cool, pale, or numb, or it changes color. · You are unable to move your fingers, wrist, or elbow. · You are sick to your stomach or cannot keep fluids down. · You have pain that does not get better after you take pain medicine. · You have signs of infection, such as:  ¨ Increased pain, swelling, warmth, or redness. ¨ Red streaks leading from the incision. ¨ Pus draining from the incision. ¨ A fever. · You have loose stitches, or your incision comes open. · Your incision bleeds through your first dressing or is still bleeding 3 days after your surgery. Watch closely for changes in your health, and be sure to contact your doctor if:  · Your sling feels too tight, and you cannot loosen it. · You have new or increased swelling in your arm. · You have new pain that develops in another area of the affected limb.  For example, you have pain in your hand or elbow. · You do not have a bowel movement after taking a laxative. · You do not get better as expected. Where can you learn more? Go to tweetTV[employer]be  Enter P437 in the search box to learn more about \"Shoulder Arthroscopy: What to Expect at Home. \"   © 5980-2988 [employer]wise, [employer]. Care instructions adapted under license by [employer] (which disclaims liability or warranty for this information). This care instruction is for use with your licensed healthcare professional. If you have questions about a medical condition or this instruction, always ask your healthcare professional. [place] [address] any warranty or liability for your use of this information. Content Version: 55.9.861695; Current as of: June 4, 2014                      These are general instructions for a healthy lifestyle:    No smoking/ No tobacco products/ Avoid exposure to second hand smoke    Surgeon General's Warning:  Quitting smoking now greatly reduces serious risk to your health. Obesity, smoking, and sedentary lifestyle greatly increases your risk for illness    A healthy diet, regular physical exercise & weight monitoring are important for maintaining a healthy lifestyle    You may be retaining fluid if you have a history of heart failure or if you experience any of the following symptoms:  Weight gain of 3 pounds or more overnight or 5 pounds in a week, increased swelling in our hands or feet or shortness of breath while lying flat in bed. Please call your doctor as soon as you notice any of these symptoms; do not wait until your next office visit. Recognize signs and symptoms of STROKE:    F-face looks uneven    A-arms unable to move or move unevenly    S-speech slurred or non-existent    T-time-call 911 as soon as signs and symptoms begin-DO NOT go       Back to bed or wait to see if you get better-TIME IS BRAIN.         The discharge information has been reviewed with the patient. The patient verbalized understanding.

## 2018-05-12 NOTE — DISCHARGE SUMMARY
[address]  MR#: [id number]  : 1975  ACCOUNT #: [de-identified]   ADMIT DATE: 05/10/2018  DISCHARGE DATE: 2018    ADMISSION DIAGNOSES:    1. Partial thickness rotator cuff tear, left shoulder. 2.  Superior labrum anterior and posterior tear, left shoulder. 3.  Bicipital strain, left shoulder. 4.  Acromioclavicular joint arthritis, left shoulder. DISCHARGE DIAGNOSES:  1. Superior labrum anterior and posterior tear, left shoulder. 2.  Bicipital strain, left shoulder. 3.  Acromioclavicular joint arthritis, left shoulder. Please see H and P, operative summary and consults for further details. HOSPITAL COURSE:  Patient is a 54-year-old gentleman who injured his left shoulder on the job. He was admitted on 05/10/2018 after undergoing an uncomplicated arthroscopy left shoulder, ASD, ADCR, extensive debridement SLAP tear of glenohumeral joint, subacromial space, mini open biceps tenodesis. Upon admission, consults were obtained for hospitalist and PT. At the time of surgery, his hemoglobin A1c was 10.2 and his fasting blood glucose level was 341. On postoperative day #1, his potassium was 4.1, magnesium was 1.5 and fasting blood glucose was 365. He was discharged on postoperative day #1 to home and will continue rehabbing on his own until followup visit. He was instructed to follow up with Dr. Felipe Murray office on 2018. DISPOSITION:  Home.       Myron Antunez NP MM/CRISTINE  D: 2018 13:47     T: 2018 06:43  JOB #: [id number]  CC: Phill Chapman MD

## 2018-05-14 ENCOUNTER — HOSPITAL ENCOUNTER (OUTPATIENT)
Dept: PHYSICAL THERAPY | Age: 43
Discharge: HOME | End: 2018-05-14
Payer: COMMERCIAL

## 2018-05-14 PROCEDURE — 97161 PT EVAL LOW COMPLEX 20 MIN: CPT

## 2018-05-14 PROCEDURE — 97140 MANUAL THERAPY 1/> REGIONS: CPT

## 2018-05-14 NOTE — PROGRESS NOTES
Ambulatory/Rehab Services H2 Model Falls Risk Assessment    Risk Factor Pts. ·   Confusion/Disorientation/Impulsivity  []    4 ·   Symptomatic Depression  []   2 ·   Altered Elimination  []   1 ·   Dizziness/Vertigo  []   1 ·   Gender (Male)  [x]   1 ·   Any administered antiepileptics (anticonvulsants):  []   2 ·   Any administered benzodiazepines:  []   1 ·   Visual Impairment (specify):  []   1 ·   Portable Oxygen Use  []   1 ·   Orthostatic ? BP  []   1 ·   History of Recent Falls (within 3 mos.)  []   5     Ability to Rise from Chair (choose one) Pts. ·   Ability to rise in a single movement  [x]   0 ·   Pushes up, successful in one attempt  []   1 ·   Multiple attempts, but successful  []   3 ·   Unable to rise without assistance  []   4   Total: (5 or greater = High Risk) 1     Falls Prevention Plan:   []                Physical Limitations to Exercise (specify):   []                Mobility Assistance Device (type):   []                Exercise/Equipment Adaptation (specify):    ©2010 [facility] [address] Patent #[id number].  Federal Law prohibits the replication, distribution or use without written permission from [facility] [employer]
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
## 2018-05-15 ENCOUNTER — HOSPITAL ENCOUNTER (OUTPATIENT)
Dept: PHYSICAL THERAPY | Age: 43
Discharge: HOME | End: 2018-05-15
Payer: COMMERCIAL

## 2018-05-15 PROCEDURE — 97140 MANUAL THERAPY 1/> REGIONS: CPT
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
## 2018-05-17 PROBLEM — E66.01 SEVERE OBESITY (BMI 35.0-39.9): Status: ACTIVE | Noted: 2018-05-17

## 2018-05-17 PROBLEM — I10 HTN (HYPERTENSION): Status: RESOLVED | Noted: 2018-05-10 | Resolved: 2018-05-17

## 2018-05-18 ENCOUNTER — HOSPITAL ENCOUNTER (OUTPATIENT)
Dept: PHYSICAL THERAPY | Age: 43
Discharge: HOME | End: 2018-05-18
Payer: COMMERCIAL

## 2018-05-18 PROCEDURE — 97140 MANUAL THERAPY 1/> REGIONS: CPT
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
## 2018-05-21 ENCOUNTER — HOSPITAL ENCOUNTER (OUTPATIENT)
Dept: PHYSICAL THERAPY | Age: 43
Discharge: HOME | End: 2018-05-21
Payer: COMMERCIAL

## 2018-05-21 PROCEDURE — 97140 MANUAL THERAPY 1/> REGIONS: CPT
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
## 2018-05-22 PROBLEM — R79.89 LOW TESTOSTERONE IN MALE: Status: ACTIVE | Noted: 2018-05-22

## 2018-05-22 PROBLEM — E78.1 HYPERTRIGLYCERIDEMIA: Status: ACTIVE | Noted: 2018-05-22

## 2018-05-23 ENCOUNTER — HOSPITAL ENCOUNTER (OUTPATIENT)
Dept: PHYSICAL THERAPY | Age: 43
Discharge: HOME | End: 2018-05-23
Payer: COMMERCIAL

## 2018-05-23 PROCEDURE — 97140 MANUAL THERAPY 1/> REGIONS: CPT
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
## 2018-05-25 ENCOUNTER — HOSPITAL ENCOUNTER (OUTPATIENT)
Dept: PHYSICAL THERAPY | Age: 43
Discharge: HOME | End: 2018-05-25
Payer: COMMERCIAL

## 2018-05-25 PROCEDURE — 97140 MANUAL THERAPY 1/> REGIONS: CPT
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
## 2018-05-29 NOTE — PROGRESS NOTES
I am accessing Mr. Ledy Freeman chart as a part of our department's internal chart auditing process. I certify that Mr. Mark Cortez is, or was, a patient in our department.   Thank you,  Abel Raman, PT  5/29/2018

## 2018-05-30 ENCOUNTER — HOSPITAL ENCOUNTER (OUTPATIENT)
Dept: PHYSICAL THERAPY | Age: 43
Discharge: HOME | End: 2018-05-30
Payer: COMMERCIAL

## 2018-05-30 PROCEDURE — 97140 MANUAL THERAPY 1/> REGIONS: CPT
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
## 2018-06-01 ENCOUNTER — HOSPITAL ENCOUNTER (OUTPATIENT)
Dept: PHYSICAL THERAPY | Age: 43
Discharge: HOME | End: 2018-06-01
Payer: COMMERCIAL

## 2018-06-01 PROBLEM — E11.8 TYPE 2 DIABETES MELLITUS WITH COMPLICATION, WITH LONG-TERM CURRENT USE OF INSULIN (HCC): Status: ACTIVE | Noted: 2018-06-01

## 2018-06-01 PROBLEM — Z79.4 TYPE 2 DIABETES MELLITUS WITH COMPLICATION, WITH LONG-TERM CURRENT USE OF INSULIN (HCC): Status: ACTIVE | Noted: 2018-06-01

## 2018-06-01 PROBLEM — E29.1 HYPOGONADISM IN MALE: Status: ACTIVE | Noted: 2018-06-01

## 2018-06-01 PROCEDURE — 97140 MANUAL THERAPY 1/> REGIONS: CPT
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
## 2018-06-04 ENCOUNTER — HOSPITAL ENCOUNTER (OUTPATIENT)
Dept: PHYSICAL THERAPY | Age: 43
Discharge: HOME | End: 2018-06-04
Payer: COMMERCIAL

## 2018-06-04 PROCEDURE — 97140 MANUAL THERAPY 1/> REGIONS: CPT
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
## 2018-06-06 ENCOUNTER — HOSPITAL ENCOUNTER (OUTPATIENT)
Dept: PHYSICAL THERAPY | Age: 43
Discharge: HOME | End: 2018-06-06
Payer: COMMERCIAL

## 2018-06-06 PROCEDURE — 97140 MANUAL THERAPY 1/> REGIONS: CPT
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
## 2018-06-11 ENCOUNTER — HOSPITAL ENCOUNTER (OUTPATIENT)
Dept: PHYSICAL THERAPY | Age: 43
Discharge: HOME | End: 2018-06-11
Payer: COMMERCIAL

## 2018-06-11 PROCEDURE — 97140 MANUAL THERAPY 1/> REGIONS: CPT
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
## 2018-06-15 ENCOUNTER — HOSPITAL ENCOUNTER (OUTPATIENT)
Dept: PHYSICAL THERAPY | Age: 43
Discharge: HOME | End: 2018-06-15
Payer: COMMERCIAL

## 2018-06-15 PROCEDURE — 97140 MANUAL THERAPY 1/> REGIONS: CPT

## 2018-06-15 PROCEDURE — 97110 THERAPEUTIC EXERCISES: CPT
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
## 2018-06-19 PROBLEM — E23.0 HYPOGONADOTROPIC HYPOGONADISM IN MALE (HCC): Status: ACTIVE | Noted: 2018-06-01

## 2018-06-20 ENCOUNTER — HOSPITAL ENCOUNTER (OUTPATIENT)
Dept: PHYSICAL THERAPY | Age: 43
Discharge: HOME | End: 2018-06-20
Payer: COMMERCIAL

## 2018-06-20 PROCEDURE — 97140 MANUAL THERAPY 1/> REGIONS: CPT

## 2018-06-20 PROCEDURE — 97110 THERAPEUTIC EXERCISES: CPT
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
## 2018-06-22 ENCOUNTER — HOSPITAL ENCOUNTER (OUTPATIENT)
Dept: PHYSICAL THERAPY | Age: 43
Discharge: HOME | End: 2018-06-22
Payer: COMMERCIAL

## 2018-06-22 PROCEDURE — 97110 THERAPEUTIC EXERCISES: CPT

## 2018-06-22 PROCEDURE — 97140 MANUAL THERAPY 1/> REGIONS: CPT
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
## 2018-07-03 ENCOUNTER — HOSPITAL ENCOUNTER (OUTPATIENT)
Dept: MRI IMAGING | Age: 43
Discharge: HOME | End: 2018-07-03
Attending: PHYSICIAN ASSISTANT
Payer: COMMERCIAL

## 2018-07-03 DIAGNOSIS — E23.0 HYPOGONADOTROPIC HYPOGONADISM IN MALE (HCC): ICD-10-CM

## 2018-07-03 LAB — CREAT BLD-MCNC: 1 MG/DL (ref 0.8–1.5)

## 2018-07-03 PROCEDURE — 74011250636 HC RX REV CODE- 250/636: Performed by: PHYSICIAN ASSISTANT

## 2018-07-03 PROCEDURE — A9575 INJ GADOTERATE MEGLUMI 0.1ML: HCPCS | Performed by: PHYSICIAN ASSISTANT

## 2018-07-03 PROCEDURE — 70553 MRI BRAIN STEM W/O & W/DYE: CPT

## 2018-07-03 PROCEDURE — 74011000258 HC RX REV CODE- 258: Performed by: PHYSICIAN ASSISTANT

## 2018-07-03 PROCEDURE — 82565 ASSAY OF CREATININE: CPT

## 2018-07-03 RX ORDER — GADOTERATE MEGLUMINE 376.9 MG/ML
22 INJECTION INTRAVENOUS
Status: COMPLETED | OUTPATIENT
Start: 2018-07-03 | End: 2018-07-03

## 2018-07-03 RX ORDER — SODIUM CHLORIDE 0.9 % (FLUSH) 0.9 %
10 SYRINGE (ML) INJECTION
Status: COMPLETED | OUTPATIENT
Start: 2018-07-03 | End: 2018-07-03

## 2018-07-03 RX ADMIN — Medication 10 ML: at 16:16

## 2018-07-03 RX ADMIN — GADOTERATE MEGLUMINE 22 ML: 376.9 INJECTION INTRAVENOUS at 16:16

## 2018-07-03 RX ADMIN — SODIUM CHLORIDE 100 ML: 900 INJECTION, SOLUTION INTRAVENOUS at 16:16

## 2018-07-05 NOTE — PROGRESS NOTES
Please notify Mr. Teja Hoff That his pituitary MRI showed no abnormalities. Patient would likely benefit from initiating testosterone therapy but not until his elevated blood pressure and his uncontrolled diabetes are better regulated. Please make sure patient is doing well with his current diabetes regimen including the new pill and the once a week injection as well as his short acting and long-acting insulin. Please make sure patient is checking his blood pressure outside of the clinic and maintaining a blood pressure log of some kind for review at his next appointment.   I believe patient will need to schedule a follow-up appointment to discuss his diabetes and elevated blood pressure before we can proceed with testosterone replacement therapy

## 2018-07-12 ENCOUNTER — HOSPITAL ENCOUNTER (OUTPATIENT)
Dept: PHYSICAL THERAPY | Age: 43
End: 2018-07-12
Payer: COMMERCIAL

## 2018-07-16 ENCOUNTER — HOSPITAL ENCOUNTER (OUTPATIENT)
Dept: PHYSICAL THERAPY | Age: 43
Discharge: HOME | End: 2018-07-16
Payer: COMMERCIAL

## 2018-07-16 PROCEDURE — 97110 THERAPEUTIC EXERCISES: CPT | Performed by: PHYSICAL THERAPIST

## 2018-07-16 PROCEDURE — 97140 MANUAL THERAPY 1/> REGIONS: CPT | Performed by: PHYSICAL THERAPIST
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
## 2018-07-24 ENCOUNTER — HOSPITAL ENCOUNTER (OUTPATIENT)
Dept: PHYSICAL THERAPY | Age: 43
Discharge: HOME | End: 2018-07-24
Payer: COMMERCIAL

## 2018-07-24 PROCEDURE — 97140 MANUAL THERAPY 1/> REGIONS: CPT

## 2018-07-24 PROCEDURE — 97110 THERAPEUTIC EXERCISES: CPT
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
## 2018-07-26 ENCOUNTER — HOSPITAL ENCOUNTER (OUTPATIENT)
Dept: PHYSICAL THERAPY | Age: 43
Discharge: HOME | End: 2018-07-26
Payer: COMMERCIAL

## 2018-07-26 PROCEDURE — 97110 THERAPEUTIC EXERCISES: CPT

## 2018-07-26 PROCEDURE — 97140 MANUAL THERAPY 1/> REGIONS: CPT
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
## 2018-07-31 ENCOUNTER — APPOINTMENT (OUTPATIENT)
Dept: PHYSICAL THERAPY | Age: 43
End: 2018-07-31
Payer: COMMERCIAL

## 2018-08-02 ENCOUNTER — HOSPITAL ENCOUNTER (OUTPATIENT)
Dept: PHYSICAL THERAPY | Age: 43
Discharge: HOME | End: 2018-08-02
Payer: COMMERCIAL

## 2018-08-02 PROCEDURE — 97140 MANUAL THERAPY 1/> REGIONS: CPT

## 2018-08-02 PROCEDURE — 97110 THERAPEUTIC EXERCISES: CPT

## 2018-08-07 ENCOUNTER — HOSPITAL ENCOUNTER (OUTPATIENT)
Dept: PHYSICAL THERAPY | Age: 43
Discharge: HOME | End: 2018-08-07
Payer: COMMERCIAL

## 2018-08-07 PROCEDURE — 97140 MANUAL THERAPY 1/> REGIONS: CPT

## 2018-08-07 PROCEDURE — 97110 THERAPEUTIC EXERCISES: CPT
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
## 2018-08-09 ENCOUNTER — HOSPITAL ENCOUNTER (OUTPATIENT)
Dept: PHYSICAL THERAPY | Age: 43
Discharge: HOME | End: 2018-08-09
Payer: COMMERCIAL

## 2018-08-09 PROCEDURE — 97110 THERAPEUTIC EXERCISES: CPT

## 2018-08-09 PROCEDURE — 97140 MANUAL THERAPY 1/> REGIONS: CPT
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
## 2018-09-13 ENCOUNTER — APPOINTMENT (OUTPATIENT)
Dept: PHYSICAL THERAPY | Age: 43
End: 2018-09-13
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
## 2018-09-24 ENCOUNTER — APPOINTMENT (OUTPATIENT)
Dept: PHYSICAL THERAPY | Age: 43
End: 2018-09-24

## 2018-09-26 ENCOUNTER — APPOINTMENT (OUTPATIENT)
Dept: PHYSICAL THERAPY | Age: 43
End: 2018-09-26

## 2018-09-26 PROBLEM — E78.2 MIXED HYPERLIPIDEMIA: Status: ACTIVE | Noted: 2018-09-26

## 2018-10-01 ENCOUNTER — APPOINTMENT (OUTPATIENT)
Dept: PHYSICAL THERAPY | Age: 43
End: 2018-10-01

## 2018-10-03 ENCOUNTER — APPOINTMENT (OUTPATIENT)
Dept: PHYSICAL THERAPY | Age: 43
End: 2018-10-03

## 2018-10-08 ENCOUNTER — APPOINTMENT (OUTPATIENT)
Dept: PHYSICAL THERAPY | Age: 43
End: 2018-10-08

## 2018-10-10 ENCOUNTER — APPOINTMENT (OUTPATIENT)
Dept: PHYSICAL THERAPY | Age: 43
End: 2018-10-10

## 2018-10-15 ENCOUNTER — APPOINTMENT (OUTPATIENT)
Dept: PHYSICAL THERAPY | Age: 43
End: 2018-10-15

## 2018-10-17 ENCOUNTER — APPOINTMENT (OUTPATIENT)
Dept: PHYSICAL THERAPY | Age: 43
End: 2018-10-17

## 2018-10-22 ENCOUNTER — APPOINTMENT (OUTPATIENT)
Dept: PHYSICAL THERAPY | Age: 43
End: 2018-10-22

## 2018-10-24 ENCOUNTER — APPOINTMENT (OUTPATIENT)
Dept: PHYSICAL THERAPY | Age: 43
End: 2018-10-24

## 2018-11-09 PROBLEM — I10 HTN (HYPERTENSION): Status: RESOLVED | Noted: 2018-05-10 | Resolved: 2018-11-09

## 2019-05-16 RX ORDER — SODIUM CHLORIDE 0.9 % (FLUSH) 0.9 %
5-40 SYRINGE (ML) INJECTION AS NEEDED
Status: CANCELLED | OUTPATIENT
Start: 2019-05-16

## 2019-05-16 RX ORDER — CEFAZOLIN SODIUM/WATER 2 G/20 ML
2 SYRINGE (ML) INTRAVENOUS ONCE
Status: CANCELLED | OUTPATIENT
Start: 2019-05-16 | End: 2019-05-16

## 2019-05-16 RX ORDER — SODIUM CHLORIDE 0.9 % (FLUSH) 0.9 %
5-40 SYRINGE (ML) INJECTION EVERY 8 HOURS
Status: CANCELLED | OUTPATIENT
Start: 2019-05-16

## 2019-05-17 ENCOUNTER — HOSPITAL ENCOUNTER (OUTPATIENT)
Dept: SURGERY | Age: 44
Discharge: HOME | End: 2019-05-17

## 2019-05-17 VITALS — BODY MASS INDEX: 33.27 KG/M2 | WEIGHT: 212 LBS | HEIGHT: 67 IN

## 2019-05-17 RX ORDER — BISMUTH SUBSALICYLATE 262 MG
1 TABLET,CHEWABLE ORAL DAILY
COMMUNITY

## 2019-05-17 RX ORDER — INSULIN GLARGINE 100 [IU]/ML
50 INJECTION, SOLUTION SUBCUTANEOUS DAILY
COMMUNITY

## 2019-05-17 NOTE — PERIOP NOTES
Patient verified name and . Order for consent found in EHR and matches case posting; patient verifies procedure. Type 1B surgery, phone assessment complete. Orders received. Labs per surgeon: none needed at this time. HGB-A1C & POC glucose to be collected dos. Labs per anesthesia protocol: none needed. Patient answered medical/surgical history questions at their best of ability. All prior to admission medications documented in [facility]. Patient instructed to take the following medications the day of surgery according to anesthesia guidelines with a small sip of water: 40 units of Lantus. Hold all vitamins 7 days prior to surgery and NSAIDS 5 days prior to surgery. Prescription meds to hold: diclofenac. Instructed to bring dos: metformin er. Patient instructed on the following:  Arrive at A Entrance, time of arrival to be called the day before by 1700  NPO after midnight including gum, mints, and ice chips  Responsible adult must drive patient to the hospital, stay during surgery, and patient will need supervision 24 hours after anesthesia  Use hibiclens in shower the night before surgery and on the morning of surgery  All piercings must be removed prior to arrival.    Leave all valuables (money and jewelry) at home but bring insurance card and ID on DOS. Do not wear make-up, nail polish, lotions, cologne, perfumes, powders, or oil on skin. Patient teach back successful and patient demonstrates knowledge of instruction.

## 2019-05-18 PROBLEM — S46.112A RUPTURE OF LEFT LONG HEAD BICEPS TENDON: Status: ACTIVE | Noted: 2019-05-18

## 2019-05-18 NOTE — H&P
[facility] HISTORY AND PHYSICAL Subjective:  
 
Patient is a 40 y.o. RHD MALE WITH LEFT SHOULDER PAIN. SEE OFFICE NOTE. Patient Active Problem List  
 Diagnosis Date Noted  Rupture of left long head biceps tendon 05/18/2019  Mixed hyperlipidemia 09/26/2018  Type 2 diabetes mellitus with complication, with long-term current use of insulin ([facility] [address] 06/01/2018  Hypogonadotropic hypogonadism in male [facility]) 06/01/2018  Hypertriglyceridemia 05/22/2018  Low testosterone in male 05/22/2018  Severe obesity (BMI 35.0-39.9) 05/17/2018  Strain of muscle(s) and tendon(s) of the rotator cuff of left shoulder, initial encounter  Hypertension  Hypomagnesemia 05/11/2018  Strain of muscle, fascia and tendon of long head of biceps, left arm, initial encounter 05/05/2018  Superior glenoid labrum lesion of left shoulder 05/05/2018  Degenerative joint disease of left acromioclavicular joint 05/05/2018 Past Medical History:  
Diagnosis Date  Degenerative joint disease of left acromioclavicular joint 5/5/2018  Diabetes ([facility] [address] 2008  
 type 2; oral agent and insulin daily; checks bs 1-2 times per day; average bs 150-190  Hypertension   
 controlled w/med  Severe obesity (BMI 35.0-39. 9) 5/17/2018  Strain of muscle(s) and tendon(s) of the rotator cuff of left shoulder, initial encounter  Strain of muscle, fascia and tendon of long head of biceps, left arm, initial encounter Past Surgical History:  
Procedure Laterality Date  HX ACL RECONSTRUCTION Left 2014 ACL, MCl, PCL  
 HX HERNIA REPAIR  2025  
 umbilical hernia  HX SHOULDER ARTHROSCOPY  05/2018 ARTHROSCOPY LEFT SHOULDER ARTHROSCOPIC SUBACROMIAL DECOMPRESSION, DISTAL CLAVICLE RESECTION, EXTENSIVE DEBRIDEMENT SLAP TEAR, GLENOHUMERAL JOINT, SUBACROMIAL SPACE, MINI BICEPS TENODESIS  (Left Shoulder) Prior to Admission medications Medication Sig Start Date End Date Taking? Authorizing Provider  
insulin glargine (LANTUS SOLOSTAR U-100 INSULIN) 100 unit/mL (3 mL) inpn 50 Units by SubCUTAneous route daily. Indications: type 2 diabetes mellitus    Provider, Historical  
multivitamin (ONE A DAY) tablet Take 1 Tab by mouth daily. Provider, Historical  
Syringe, Disposable, 1 mL syrg Use 1 every 2 weeks with Testosterone Cypionate, E29.1 11/9/18   Norm Hyman PA-C Safety Needles 18 gauge x 1\" ndle Use 1 every 2 weeks with Testosterone Cypionate, E29.1 11/9/18   Norm Hyman PA-C Needle, Disp, 23 G 23 gauge x 1 1/2\" ndle Use 1 every 2 weeks with Testosterone Cypionate, E29.1 11/9/18   Luis F Armendariz PA-C  
OZEMPIC 0.25 mg or 0.5 mg(2 mg/1.5 mL) sub-q pen 0.5 mg by SubCUTAneous route every seven (7) days. 11/9/18   Dania DURAN PA-C  
lisinopril (PRINIVIL, ZESTRIL) 20 mg tablet Take 1 Tab by mouth daily. 11/9/18   Norm Hyman PA-C  
rosuvastatin (CRESTOR) 20 mg tablet Take 1 Tab by mouth nightly. 11/9/18   Norm Hyman PA-C  
metFORMIN ER 1,000 mg tr24 Take 1 Tab by mouth daily. 9/26/18   Norm Hyman PA-C  
diclofenac (VOLTAREN) 1 % gel APPLY 2 GRAM(S) TRANSDERMAL EVERY 6 HOURS FOR 30 DAYS 8/14/18   Provider, Historical  
Blood Pressure Monitor kit Use to monitor BP daily, large cuff 8/20/18   Norm Hyman PA-C  
NOVOLOG FLEXPEN U-100 INSULIN 100 unit/mL inpn Use with correction 2/50 >150, max daily dose 30 units 6/11/18   Luis F Armendariz PA-C No Known Allergies Social History Tobacco Use  Smoking status: Never Smoker  Smokeless tobacco: Never Used Substance Use Topics  Alcohol use: No  
  
Family History Problem Relation Age of Onset  Heart Attack Mother  Diabetes Father  Diabetes Sister  No Known Problems Brother  Pneumonia Maternal Grandmother  Diabetes Maternal Grandmother  Lung Cancer Maternal Grandfather  No Known Problems Sister Review of Systems A comprehensive review of systems was negative except for that written in the HPI. Objective:  
 
No data found. There were no vitals taken for this visit. General:  Alert, cooperative, no distress, appears stated age. Head:  Normocephalic, without obvious abnormality, atraumatic. Back:   Symmetric, no curvature. ROM normal. No CVA tenderness. Lungs:   Clear to auscultation bilaterally. Chest wall:  No tenderness or deformity. Heart:  Regular rate and rhythm, S1, S2 normal, no murmur, click, rub or gallop. Extremities: Extremities normal, atraumatic, no cyanosis or edema. Pulses: 2+ and symmetric all extremities. Skin: Skin color, texture, turgor normal. No rashes or lesions Lymph nodes: Cervical, supraclavicular, and axillary nodes normal.  
Neurologic: CNII-XII intact. Normal strength, sensation and reflexes throughout. Assessment:  
 
Principal Problem: 
  Rupture of left long head biceps tendon (5/18/2019) Plan: The various methods of treatment have been discussed with the patient and family. PATIENT HAS EXHAUSTED NON-OPERATIVE MODALITIES After consideration of risks, benefits and other options for treatment, the patient has consented to surgical intervention. SEE OFFICE NOTE Aletha Adamson MD

## 2019-05-18 NOTE — BRIEF OP NOTE
BRIEF OPERATIVE NOTE    Date of Procedure: 5/24/2019     Preoperative Diagnosis:  TORN LONG HEAD BICEPS TENDON LEFT SHOULDER    Postoperative Diagnosis:  SAME    Procedure(s): OPEN REVISION BICEPS TENODESIS LEFT SHOULDER    Surgeon(s) and Role:     * Roshan Ortiz MD - Primary         Assistant Staff:  Robyn Rasmey [facility]      Surgical Staff:  Circ-1: (Unknown)  Scrub Tech-1: (Unknown)  Scrub Tech-2: (Unknown)  Scrub Tech-3: (Unknown)  No case tracking events are documented in the log. Anesthesia:  GENERAL WITH INTERSCALENE BLOCK    Estimated Blood Loss: 20 cc. Complications: NONE    Implants:   Implant Name Type Inv.  Item Serial No.  Lot No. LRB No. Used Action   ANCHOR SUT 2.3MM W/2.0MM BRAID --  - [id number]  ANCHOR SUT 2.3MM W/2.0MM BRAID --  [id number] [employer]ISA[employer]COPY [id number] Left 2 Implanted       Michelle Villasenor MD

## 2019-05-24 ENCOUNTER — APPOINTMENT (OUTPATIENT)
Dept: GENERAL RADIOLOGY | Age: 44
End: 2019-05-24
Attending: ORTHOPAEDIC SURGERY
Payer: OTHER MISCELLANEOUS

## 2019-05-24 ENCOUNTER — ANESTHESIA EVENT (OUTPATIENT)
Dept: SURGERY | Age: 44
End: 2019-05-24
Payer: OTHER MISCELLANEOUS

## 2019-05-24 ENCOUNTER — HOSPITAL ENCOUNTER (OUTPATIENT)
Age: 44
Setting detail: OUTPATIENT SURGERY
Discharge: HOME | End: 2019-05-24
Attending: ORTHOPAEDIC SURGERY | Admitting: ORTHOPAEDIC SURGERY
Payer: OTHER MISCELLANEOUS

## 2019-05-24 ENCOUNTER — ANESTHESIA (OUTPATIENT)
Dept: SURGERY | Age: 44
End: 2019-05-24
Payer: OTHER MISCELLANEOUS

## 2019-05-24 VITALS
OXYGEN SATURATION: 97 % | SYSTOLIC BLOOD PRESSURE: 116 MMHG | TEMPERATURE: 97.5 F | HEART RATE: 98 BPM | DIASTOLIC BLOOD PRESSURE: 70 MMHG | RESPIRATION RATE: 18 BRPM | WEIGHT: 212 LBS | HEIGHT: 67 IN | BODY MASS INDEX: 33.27 KG/M2

## 2019-05-24 LAB
EST. AVERAGE GLUCOSE BLD GHB EST-MCNC: 186 MG/DL
GLUCOSE BLD STRIP.AUTO-MCNC: 145 MG/DL (ref 65–100)
HBA1C MFR BLD: 8.1 %

## 2019-05-24 PROCEDURE — 77030002913 HC SUT ETHBND J&J -B: Performed by: ORTHOPAEDIC SURGERY

## 2019-05-24 PROCEDURE — 77030031139 HC SUT VCRL2 J&J -A: Performed by: ORTHOPAEDIC SURGERY

## 2019-05-24 PROCEDURE — 74011250636 HC RX REV CODE- 250/636: Performed by: ANESTHESIOLOGY

## 2019-05-24 PROCEDURE — 74011250636 HC RX REV CODE- 250/636

## 2019-05-24 PROCEDURE — 77030037088 HC TUBE ENDOTRACH ORAL NSL COVD-A: Performed by: ANESTHESIOLOGY

## 2019-05-24 PROCEDURE — 74011250636 HC RX REV CODE- 250/636: Performed by: ORTHOPAEDIC SURGERY

## 2019-05-24 PROCEDURE — 76010010054 HC POST OP PAIN BLOCK: Performed by: ORTHOPAEDIC SURGERY

## 2019-05-24 PROCEDURE — 77030011283 HC ELECTRD NDL COVD -A: Performed by: ORTHOPAEDIC SURGERY

## 2019-05-24 PROCEDURE — 77030013708 HC HNDPC SUC IRR PULS STRY –B: Performed by: ORTHOPAEDIC SURGERY

## 2019-05-24 PROCEDURE — 76060000035 HC ANESTHESIA 2 TO 2.5 HR: Performed by: ORTHOPAEDIC SURGERY

## 2019-05-24 PROCEDURE — 77030002986 HC SUT PROL J&J -A: Performed by: ORTHOPAEDIC SURGERY

## 2019-05-24 PROCEDURE — 74011000250 HC RX REV CODE- 250

## 2019-05-24 PROCEDURE — 77030003602 HC NDL NRV BLK BBMI -B: Performed by: ANESTHESIOLOGY

## 2019-05-24 PROCEDURE — 77030020782 HC GWN BAIR PAWS FLX 3M -B: Performed by: ANESTHESIOLOGY

## 2019-05-24 PROCEDURE — C1713 ANCHOR/SCREW BN/BN,TIS/BN: HCPCS | Performed by: ORTHOPAEDIC SURGERY

## 2019-05-24 PROCEDURE — 76210000021 HC REC RM PH II 0.5 TO 1 HR: Performed by: ORTHOPAEDIC SURGERY

## 2019-05-24 PROCEDURE — 82962 GLUCOSE BLOOD TEST: CPT

## 2019-05-24 PROCEDURE — 77030039425 HC BLD LARYNG TRULITE DISP TELE -A: Performed by: ANESTHESIOLOGY

## 2019-05-24 PROCEDURE — 83036 HEMOGLOBIN GLYCOSYLATED A1C: CPT

## 2019-05-24 PROCEDURE — 76010000161 HC OR TIME 1 TO 1.5 HR INTENSV-TIER 1: Performed by: ORTHOPAEDIC SURGERY

## 2019-05-24 PROCEDURE — 76942 ECHO GUIDE FOR BIOPSY: CPT | Performed by: ORTHOPAEDIC SURGERY

## 2019-05-24 PROCEDURE — 77030018836 HC SOL IRR NACL ICUM -A: Performed by: ORTHOPAEDIC SURGERY

## 2019-05-24 PROCEDURE — 73030 X-RAY EXAM OF SHOULDER: CPT

## 2019-05-24 PROCEDURE — 76210000063 HC OR PH I REC FIRST 0.5 HR: Performed by: ORTHOPAEDIC SURGERY

## 2019-05-24 DEVICE — ICONIX 2 NEEDLES WITH INTELLIBRAID TECHNOLOGY, 2.3MM ANCHOR WITH 2 STRANDS #2 FORCE FIBER
Type: IMPLANTABLE DEVICE | Site: SHOULDER | Status: FUNCTIONAL
Brand: ICONIX

## 2019-05-24 RX ORDER — HYDROMORPHONE HYDROCHLORIDE 2 MG/ML
0.5 INJECTION, SOLUTION INTRAMUSCULAR; INTRAVENOUS; SUBCUTANEOUS
Status: DISCONTINUED | OUTPATIENT
Start: 2019-05-24 | End: 2019-05-24 | Stop reason: HOSPADM

## 2019-05-24 RX ORDER — DEXAMETHASONE SODIUM PHOSPHATE 4 MG/ML
INJECTION, SOLUTION INTRA-ARTICULAR; INTRALESIONAL; INTRAMUSCULAR; INTRAVENOUS; SOFT TISSUE
Status: COMPLETED | OUTPATIENT
Start: 2019-05-24 | End: 2019-05-24

## 2019-05-24 RX ORDER — MIDAZOLAM HYDROCHLORIDE 1 MG/ML
5 INJECTION, SOLUTION INTRAMUSCULAR; INTRAVENOUS ONCE
Status: COMPLETED | OUTPATIENT
Start: 2019-05-24 | End: 2019-05-24

## 2019-05-24 RX ORDER — ONDANSETRON 2 MG/ML
INJECTION INTRAMUSCULAR; INTRAVENOUS AS NEEDED
Status: DISCONTINUED | OUTPATIENT
Start: 2019-05-24 | End: 2019-05-24 | Stop reason: HOSPADM

## 2019-05-24 RX ORDER — SODIUM CHLORIDE 0.9 % (FLUSH) 0.9 %
5-40 SYRINGE (ML) INJECTION AS NEEDED
Status: DISCONTINUED | OUTPATIENT
Start: 2019-05-24 | End: 2019-05-24 | Stop reason: HOSPADM

## 2019-05-24 RX ORDER — PROPOFOL 10 MG/ML
INJECTION, EMULSION INTRAVENOUS AS NEEDED
Status: DISCONTINUED | OUTPATIENT
Start: 2019-05-24 | End: 2019-05-24 | Stop reason: HOSPADM

## 2019-05-24 RX ORDER — ROCURONIUM BROMIDE 10 MG/ML
INJECTION, SOLUTION INTRAVENOUS AS NEEDED
Status: DISCONTINUED | OUTPATIENT
Start: 2019-05-24 | End: 2019-05-24 | Stop reason: HOSPADM

## 2019-05-24 RX ORDER — SODIUM CHLORIDE, SODIUM LACTATE, POTASSIUM CHLORIDE, CALCIUM CHLORIDE 600; 310; 30; 20 MG/100ML; MG/100ML; MG/100ML; MG/100ML
75 INJECTION, SOLUTION INTRAVENOUS CONTINUOUS
Status: DISCONTINUED | OUTPATIENT
Start: 2019-05-24 | End: 2019-05-24 | Stop reason: HOSPADM

## 2019-05-24 RX ORDER — OXYCODONE HYDROCHLORIDE 5 MG/1
5 TABLET ORAL
Status: DISCONTINUED | OUTPATIENT
Start: 2019-05-24 | End: 2019-05-24 | Stop reason: HOSPADM

## 2019-05-24 RX ORDER — HYDROCODONE BITARTRATE AND ACETAMINOPHEN 5; 325 MG/1; MG/1
2 TABLET ORAL AS NEEDED
Status: DISCONTINUED | OUTPATIENT
Start: 2019-05-24 | End: 2019-05-24 | Stop reason: HOSPADM

## 2019-05-24 RX ORDER — EPHEDRINE SULFATE 50 MG/ML
INJECTION, SOLUTION INTRAVENOUS AS NEEDED
Status: DISCONTINUED | OUTPATIENT
Start: 2019-05-24 | End: 2019-05-24 | Stop reason: HOSPADM

## 2019-05-24 RX ORDER — GLYCOPYRROLATE 0.2 MG/ML
INJECTION INTRAMUSCULAR; INTRAVENOUS AS NEEDED
Status: DISCONTINUED | OUTPATIENT
Start: 2019-05-24 | End: 2019-05-24 | Stop reason: HOSPADM

## 2019-05-24 RX ORDER — CEFAZOLIN SODIUM/WATER 2 G/20 ML
2 SYRINGE (ML) INTRAVENOUS ONCE
Status: COMPLETED | OUTPATIENT
Start: 2019-05-24 | End: 2019-05-24

## 2019-05-24 RX ORDER — NEOSTIGMINE METHYLSULFATE 1 MG/ML
INJECTION INTRAVENOUS AS NEEDED
Status: DISCONTINUED | OUTPATIENT
Start: 2019-05-24 | End: 2019-05-24 | Stop reason: HOSPADM

## 2019-05-24 RX ORDER — FENTANYL CITRATE 50 UG/ML
100 INJECTION, SOLUTION INTRAMUSCULAR; INTRAVENOUS ONCE
Status: COMPLETED | OUTPATIENT
Start: 2019-05-24 | End: 2019-05-24

## 2019-05-24 RX ORDER — LIDOCAINE HYDROCHLORIDE 10 MG/ML
0.1 INJECTION INFILTRATION; PERINEURAL AS NEEDED
Status: DISCONTINUED | OUTPATIENT
Start: 2019-05-24 | End: 2019-05-24 | Stop reason: HOSPADM

## 2019-05-24 RX ORDER — MIDAZOLAM HYDROCHLORIDE 1 MG/ML
2 INJECTION, SOLUTION INTRAMUSCULAR; INTRAVENOUS
Status: DISCONTINUED | OUTPATIENT
Start: 2019-05-24 | End: 2019-05-24 | Stop reason: HOSPADM

## 2019-05-24 RX ORDER — SODIUM CHLORIDE 0.9 % (FLUSH) 0.9 %
5-40 SYRINGE (ML) INJECTION EVERY 8 HOURS
Status: DISCONTINUED | OUTPATIENT
Start: 2019-05-24 | End: 2019-05-24 | Stop reason: HOSPADM

## 2019-05-24 RX ORDER — LIDOCAINE HYDROCHLORIDE 20 MG/ML
INJECTION, SOLUTION EPIDURAL; INFILTRATION; INTRACAUDAL; PERINEURAL AS NEEDED
Status: DISCONTINUED | OUTPATIENT
Start: 2019-05-24 | End: 2019-05-24 | Stop reason: HOSPADM

## 2019-05-24 RX ADMIN — ONDANSETRON 4 MG: 2 INJECTION INTRAMUSCULAR; INTRAVENOUS at 10:45

## 2019-05-24 RX ADMIN — EPHEDRINE SULFATE 10 MG: 50 INJECTION, SOLUTION INTRAVENOUS at 10:45

## 2019-05-24 RX ADMIN — GLYCOPYRROLATE 0.4 MG: 0.2 INJECTION INTRAMUSCULAR; INTRAVENOUS at 11:37

## 2019-05-24 RX ADMIN — SODIUM CHLORIDE, SODIUM LACTATE, POTASSIUM CHLORIDE, AND CALCIUM CHLORIDE: 600; 310; 30; 20 INJECTION, SOLUTION INTRAVENOUS at 10:36

## 2019-05-24 RX ADMIN — LIDOCAINE HYDROCHLORIDE 0.1 ML: 10 INJECTION, SOLUTION INFILTRATION; PERINEURAL at 09:15

## 2019-05-24 RX ADMIN — DEXAMETHASONE SODIUM PHOSPHATE 4 MG: 4 INJECTION, SOLUTION INTRA-ARTICULAR; INTRALESIONAL; INTRAMUSCULAR; INTRAVENOUS; SOFT TISSUE at 10:00

## 2019-05-24 RX ADMIN — ROCURONIUM BROMIDE 50 MG: 10 INJECTION, SOLUTION INTRAVENOUS at 10:36

## 2019-05-24 RX ADMIN — SODIUM CHLORIDE, SODIUM LACTATE, POTASSIUM CHLORIDE, AND CALCIUM CHLORIDE 75 ML/HR: 600; 310; 30; 20 INJECTION, SOLUTION INTRAVENOUS at 09:00

## 2019-05-24 RX ADMIN — Medication 2 G: at 10:31

## 2019-05-24 RX ADMIN — MIDAZOLAM 3 MG: 1 INJECTION INTRAMUSCULAR; INTRAVENOUS at 09:56

## 2019-05-24 RX ADMIN — NEOSTIGMINE METHYLSULFATE 3 MG: 1 INJECTION INTRAVENOUS at 11:37

## 2019-05-24 RX ADMIN — LIDOCAINE HYDROCHLORIDE 100 MG: 20 INJECTION, SOLUTION EPIDURAL; INFILTRATION; INTRACAUDAL; PERINEURAL at 10:36

## 2019-05-24 RX ADMIN — EPHEDRINE SULFATE 10 MG: 50 INJECTION, SOLUTION INTRAVENOUS at 10:49

## 2019-05-24 RX ADMIN — PROPOFOL 200 MG: 10 INJECTION, EMULSION INTRAVENOUS at 10:36

## 2019-05-24 RX ADMIN — FENTANYL CITRATE 50 MCG: 50 INJECTION INTRAMUSCULAR; INTRAVENOUS at 09:56

## 2019-05-24 NOTE — H&P
Date of Surgery Update: Lien Greer was seen and examined. History and physical has been reviewed. The patient has been examined.  There have been no significant clinical changes since the completion of the originally dated History and Physical.    Signed By: Ann Marie Ly MD     May 24, 2019 8:19 AM

## 2019-05-24 NOTE — DISCHARGE INSTRUCTIONS
INSTRUCTIONS FOLLOWING ARTHROSCOPY SURGERY  Dr. Keisha Martinez [phone]    ACTIVITY   As tolerated and as directed by your doctor   Elevate surgery site first 48 hours.  Use arm sling or crutches per your doctors instructions.  Bathe or shower as directed by your doctor. DIET   Clear liquids until no nausea or vomiting; then light diet for the first day   Advance to regular diet on second day, unless your doctor orders otherwise.  If nausea and vomiting continues, call your doctor. PAIN   Take pain medication as directed by your doctor.  Call your doctor if pain is NOT relieved by medication.  DO NOT take aspirin or blood thinners until directed by your doctor. DRESSING CARE: Follow all dressing care instructions provided by Dr. Kala Russell will be made by nursing staff.  If you have any problems or concerns, call your doctor as needed. CALL YOUR DOCTOR IF   Excessive bleeding that does not stop after holding mild pressure over the area   Temperature of 101°F or above   Redness, excessive swelling or bruising, and/or green or yellow, smelly discharge from incision    AFTER ANESTHESIA   For the next 24 hours: DO NOT Drive, Drink alcoholic beverages, or Make important decisions.  Be aware of dizziness following anesthesia and while taking pain medication. MEDICATIONS:  Continue home medications as previously prescribed with the following changes: {None:07278::\"none\"}. Cryo Cuff or Iceman 24-48 hours continuously    APPOINTMENT DATE/TIME: On *** at *** {Time; am/pm:16943} at the *** location.

## 2019-05-24 NOTE — ANESTHESIA PREPROCEDURE EVALUATION
Relevant Problems   No relevant active problems       Anesthetic History   No history of anesthetic complications            Review of Systems / Medical History  Patient summary reviewed and pertinent labs reviewed    Pulmonary  Within defined limits                 Neuro/Psych   Within defined limits           Cardiovascular    Hypertension: well controlled              Exercise tolerance: >4 METS     GI/Hepatic/Renal  Within defined limits              Endo/Other    Diabetes: well controlled, type 2, using insulin    Obesity and arthritis     Other Findings              Physical Exam    Airway  Mallampati: II  TM Distance: 4 - 6 cm  Neck ROM: normal range of motion   Mouth opening: Normal     Cardiovascular  Regular rate and rhythm,  S1 and S2 normal,  no murmur, click, rub, or gallop             Dental         Pulmonary  Breath sounds clear to auscultation               Abdominal         Other Findings            Anesthetic Plan    ASA: 3  Anesthesia type: general      Post-op pain plan if not by surgeon: peripheral nerve block single    Induction: Intravenous  Anesthetic plan and risks discussed with: Patient and Spouse

## 2019-05-24 NOTE — ANESTHESIA POSTPROCEDURE EVALUATION
Procedure(s):  OPEN REVISION BICEPS TENODESIS LEFT SHOULDER.     general    Anesthesia Post Evaluation      Multimodal analgesia: multimodal analgesia used between 6 hours prior to anesthesia start to PACU discharge  Patient location during evaluation: bedside  Patient participation: complete - patient participated  Level of consciousness: responsive to verbal stimuli  Pain management: adequate  Airway patency: patent  Anesthetic complications: no  Cardiovascular status: hemodynamically stable  Respiratory status: spontaneous ventilation  Hydration status: stable        Vitals Value Taken Time   /78 5/24/2019 12:06 PM   Temp 36.4 °C (97.5 °F) 5/24/2019 11:51 AM   Pulse 86 5/24/2019 12:06 PM   Resp 16 5/24/2019 12:06 PM   SpO2 95 % 5/24/2019 12:06 PM

## 2019-05-24 NOTE — ANESTHESIA PROCEDURE NOTES
Peripheral Block    Start time: 5/24/2019 9:57 AM  End time: 5/24/2019 10:01 AM  Performed by: Cielo Malcolm MD  Authorized by: Cielo Malcolm MD       Pre-procedure: Indications: at surgeon's request, post-op pain management and procedure for pain    Preanesthetic Checklist: patient identified, risks and benefits discussed, site marked, timeout performed, anesthesia consent given and patient being monitored    Timeout Time: 09:56          Block Type:   Block Type:   Interscalene  Laterality:  Left  Monitoring:  Standard ASA monitoring, responsive to questions, oxygen, continuous pulse ox, heart rate and frequent vital sign checks  Injection Technique:  Single shot  Procedures: ultrasound guided and nerve stimulator    Patient Position: seated  Prep: chlorhexidine    Location:  Interscalene  Needle Type:  Stimuplex  Needle Gauge:  22 G  Needle Localization:  Nerve stimulator and ultrasound guidance  Motor Response: minimal motor response >0.4 mA      Assessment:  Number of attempts:  1  Injection Assessment:  Incremental injection every 5 mL, no paresthesia, ultrasound image on chart, local visualized surrounding nerve on ultrasound, negative aspiration for blood and no intravascular symptoms  Patient tolerance:  Patient tolerated the procedure well with no immediate complications

## 2019-05-25 NOTE — OP NOTES
[place]  OPERATIVE REPORT    Name:  Valerie Salazar  MR#:  [id number]  :  1975  ACCOUNT #:  [de-identified]  DATE OF SERVICE:  2019    PREOPERATIVE DIAGNOSIS:  Torn long head of the biceps tendon, left shoulder. POSTOPERATIVE DIAGNOSIS:  Torn long head of the biceps tendon, left shoulder. PROCEDURE PERFORMED:  Open revision, biceps tenodesis, left shoulder. SURGEON:  Pearl Mantilla. Paz Estrada MD    CERTIFIED FIRST ASSISTANT:  Cinthia Jackson Certified First Assistant. Use of a certified first assistant was necessary to optimize the patient's safety and outcome. ANESTHESIA:  General with an interscalene block. COMPLICATIONS:  None. IMPLANTS:  Hardware utilized are two Iconix 2.3 anchors. ESTIMATED BLOOD LOSS:  20 mL. PATHOLOGY:  Torn long head of the biceps tendon with a Jim sign. CPT CODE:  23282 with a modifier 22 for revision procedure. ICD-10 CODE:  A13.780. INDICATIONS:  The patient is a 71-year-old gentleman, who was initially hurt at work. The patient underwent a previous operative procedure to the left shoulder, which consisted of biceps tenodesis. The patient was rated and released. The patient returned with complaints of left arm pain and deformity. Preoperative radiographic studies and exam are consistent with a torn long head biceps tendon with a Jim sign, which is painful. The patient is now brought back to the operative suite for revision biceps tenodesis. PROCEDURE:  Following identification of the patient, the patient was taken to the operative suite. Following administration of general anesthesia, interscalene block for postop pain control, 2 g of IV Ancef, and measurement of the hemoglobin A1c and fasting blood glucose elevated 8.1 and 145 respectively, which is higher than what it was previously, the patient was then positioned on the operating room table in supine fashion.   Left shoulder and elbow were put through full range of motion. There were no restrictions, no stiffness. The patient had an obvious Jim sign on the left side. At this point, the left shoulder was then prepped and draped in sterile fashion. An extensile deltopectoral incision was marked. InteguSeal was applied. Once the InteguSeal was allowed to cure, a 4 cm incision was performed proximally. The dissection was carried down to the cephalic vein. This was dissected proximally and distally, retracted laterally over deltoid. Pec major and strap muscles were retracted medially. At this point, the pec major tendon was identified. A portion of the biceps tendon was identified. It was very poor quality tissue . Previous suture material was removed. The bulk of the biceps tendon was absent and retracted distally. At this point, a second incision was placed distally roughly 3 cm. Dissection was carried down to the biceps tendon. The biceps tendon was completely mobilized. The tissue was very poor quality tissue. The belly of the muscle itself was okay, but the tendon only had decent tissue extending proximally for about 3 cm. At this point, the biceps was then completely mobilized to an appropriate length. This was then marked on the humeral shaft. Two Iconix 2.3 anchors were then placed in the humeral shaft and the biceps was tenodesed out to length. This was oversewn using #5 Mersilene sutures. The arm was put through range of motion. Shoulder was stable. Elbow was stable and the biceps was brought out to length. There was no evidence of any further Jim sign. At this point, the wounds were then irrigated and closed with 0 Vicryl figure-of-eight sutures and 2-0 Prolene subcuticular stitch. A sterile dressing was applied. Aquacel was applied. A Cryo/Cuff and swathe was applied. The patient was then transferred to the recovery room in stable condition. This was a Workmen's Comp case revision procedure.         Vale Walker, MD BARNES/[id number]  D:  05/24/2019 11:52  T:  05/24/2019 14:58  JOB #:  [id number]  CC:   Janna Torres MD

## 2019-06-27 NOTE — PROGRESS NOTES
Tracy Salvador  : 1975  Payor: [address] / Plan: [address] / Product Type: Workers Comp /  [facility] at [address] [place] [address] [address], [place], [address] [place] [address] Road  [phone]   [phone]                                                         OUTPATIENT PHYSICAL THERAPY:Initial Assessment 2019   ICD-10: Treatment Diagnosis:   Pain in left shoulder (M25.512)  Bursitis of left shoulder (M75.52)            Strain of muscle(s) and tendon(s) of the rotator cuff of left shoulder, sequela (S46.012S)                 Precautions/Allergies:   Patient has no known allergies. *  TREATMENT PLAN:  Effective Dates: 2019 TO 2019 (90 days). Frequency/Duration: 2 times a week for 90 Day(s) MEDICAL/REFERRING DIAGNOSIS:  s/p left shoulder open revision biceps tenodesis   DATE OF ONSET: 19  REFERRING PHYSICIAN: Susan Ortiz MD MD Orders: Doreen Cali and Treat --Gentle PROM   Return MD Appointment:               OUTPATIENT PHYSICAL THERAPY: Daily Treatment Note 2019 Visit Count:  1    Pre-treatment Symptoms/Complaints:  *Shoulder tightness L UE*  Pain: Initial:   *1/10 Post Session:  0/10   Medications Last Reviewed:  2019   Updated Objective Findings:    Shoulder Flexion 160 deg Passively 160 deg   Shoulder Abduction 175 deg Passively 160 deg   Shoulder ER T2 Top of head, 40 deg   Shoulder IR (Behind the back) L1 Buttock, passively *58 deg      TREATMENT:   THERAPEUTIC EXERCISE: (* minutes):  Exercises per grid below to improve mobility, strength and balance. Required minimal visual, verbal and manual cues to promote proper body alignment and promote proper body posture. Progressed resistance and complexity of movement as indicated.      Date:   Date:   Date:     Activity/Exercise Parameters Parameters Parameters                                                   MANUAL THERAPY: (24 minutes): Joint mobilization and Soft tissue mobilization was utilized and necessary because of the patient's restricted joint motion and restricted motion of soft tissue. PROM L shoulder all planes, gentle PAs, inferior grade III+,     [employer] Portal  Treatment/Session Summary:  Verbalized understanding of HEP--PROM until sees MD July 8.    · Response to Treatment:  No increase in pain or adverse reactions  · Communication/Consultation:  Faxed initial evaluation to MD  · Equipment provided today:  HEP. Recommendations/Intent for next treatment session: Next visit will focus on ROM and return to PLOF. Total Treatment Billable Duration:  *24* Rx  PT Patient Time In/Time Out  Time In: 1000  Time Out: [address] Roddy Ebbing, DPT    No future appointments.
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
## 2019-06-28 ENCOUNTER — HOSPITAL ENCOUNTER (OUTPATIENT)
Dept: PHYSICAL THERAPY | Age: 44
Discharge: HOME | End: 2019-06-28
Payer: OTHER MISCELLANEOUS

## 2019-06-28 PROCEDURE — 97161 PT EVAL LOW COMPLEX 20 MIN: CPT

## 2019-06-28 PROCEDURE — 97140 MANUAL THERAPY 1/> REGIONS: CPT

## 2019-07-01 NOTE — PROGRESS NOTES
Pathfork Diss  : 1975  Payor: Tanna Lima / Plan: [address] / Product Type: Workers Comp /  [address] at [address] [place] [address] [address], [place], [address] [place] [address] Road  [phone]   [phone]                                                         OUTPATIENT PHYSICAL THERAPY:Initial Assessment 2019   ICD-10: Treatment Diagnosis:   Pain in left shoulder (M25.512)  Bursitis of left shoulder (M75.52)            Strain of muscle(s) and tendon(s) of the rotator cuff of left shoulder, sequela (S46.012S)                 Precautions/Allergies:   Patient has no known allergies. *  TREATMENT PLAN:  Effective Dates: 2019 TO 2019 (90 days). Frequency/Duration: 2 times a week for 90 Day(s) MEDICAL/REFERRING DIAGNOSIS:  s/p left shoulder open revision biceps tenodesis   DATE OF ONSET: 19  REFERRING PHYSICIAN: Jo Ortiz MD MD Orders: Maddison Patel and Treat --Gentle PROM   Return MD Appointment:               OUTPATIENT PHYSICAL THERAPY: Daily Treatment Note 2019 Visit Count:  2    Pre-treatment Symptoms/Complaints:  *Shoulder tightness L UE*  Pain: Initial:   *110 Post Session:  0/10   Medications Last Reviewed:  2019   Updated Objective Findings:    Shoulder Flexion 160 deg Passively 160 deg   Shoulder Abduction 175 deg Passively 160 deg   Shoulder ER T2 Top of head, 40 deg   Shoulder IR (Behind the back) L1 Buttock, passively *58 deg      TREATMENT:   THERAPEUTIC EXERCISE: (* minutes):  Exercises per grid below to improve mobility, strength and balance. Required minimal visual, verbal and manual cues to promote proper body alignment and promote proper body posture. Progressed resistance and complexity of movement as indicated.      Date:   Date:   Date:     Activity/Exercise Parameters Parameters Parameters                                                   MANUAL THERAPY: (*40 minutes): Joint mobilization and Soft tissue mobilization was utilized and necessary because of the patient's restricted joint motion and restricted motion of soft tissue. Gentle PROM L shoulder all planes (20 minutes), gentle PAs, inferior grade PARISH+, APs grade IV+, STM L upper trap and posterior deltoid. MedBridge Portal  Treatment/Session Summary:  Verbalized understanding of HEP--PROM until sees MD July 8. Continued gentle PROM --limitations at end range with pain---this improved by end of session and further range of motion achieved. We will continue this regimen until he sees MD next week and new order is provided. · Response to Treatment:  No increase in pain or adverse reactions  · Communication/Consultation:  Faxed initial evaluation to MD  · Equipment provided today:  HEP. Recommendations/Intent for next treatment session: Next visit will focus on ROM and return to PLOF.     Total Treatment Billable Duration:  *40* Rx  PT Patient Time In/Time Out  Time In: 1050  Time Out: 44 [facility] Ambar Mandujano DPT    Future Appointments   Date Time Provider Silvana Weiner   7/5/2019 11:00 AM Lucila Dyer DPT [facility] AND [facility]   7/8/2019  4:15 PM CODY UmañaOSOSCAR [facility]   7/10/2019  8:45 AM [facility]   7/12/2019  8:45 AM [facility]

## 2019-07-02 ENCOUNTER — HOSPITAL ENCOUNTER (OUTPATIENT)
Dept: PHYSICAL THERAPY | Age: 44
Discharge: HOME | End: 2019-07-02
Payer: OTHER MISCELLANEOUS

## 2019-07-02 PROCEDURE — 97140 MANUAL THERAPY 1/> REGIONS: CPT

## 2019-07-08 ENCOUNTER — HOSPITAL ENCOUNTER (OUTPATIENT)
Dept: PHYSICAL THERAPY | Age: 44
Discharge: HOME | End: 2019-07-08
Payer: OTHER MISCELLANEOUS

## 2019-07-08 PROCEDURE — 97110 THERAPEUTIC EXERCISES: CPT

## 2019-07-08 PROCEDURE — 97140 MANUAL THERAPY 1/> REGIONS: CPT

## 2019-07-08 NOTE — PROGRESS NOTES
Julio Villatoro  : 1975  Payor: Ralph Aguayo / Plan: [address] / Product Type: Workers Comp /  [address] [address] at [address] [place] [address], [address], [facility], [address] [place] [address]  [phone]   [phone]                                                            ICD-10: Treatment Diagnosis:   Pain in left shoulder (M25.512)  Bursitis of left shoulder (M75.52)            Strain of muscle(s) and tendon(s) of the rotator cuff of left shoulder, sequela (S46.012S)                 Precautions/Allergies:   Patient has no known allergies. *  TREATMENT PLAN:  Effective Dates: 2019 TO 2019 (90 days). Frequency/Duration: 2 times a week for 90 Day(s) MEDICAL/REFERRING DIAGNOSIS:  s/p left shoulder open revision biceps tenodesis   DATE OF ONSET: 19  REFERRING PHYSICIAN: Yady Ortiz MD MD Orders: Jacklyn Rodríguez and Treat --Full strengthening, Full ROM   Return MD Appointment:               OUTPATIENT PHYSICAL THERAPY: Daily Treatment Note 2019 Visit Count:  3    Pre-treatment Symptoms/Complaints:  *Shoulder tightness L UE*  Feels better at end of session--\"this is helping\"  Pain: Initial:   *1/10 Post Session:  0/10   Medications Last Reviewed:  2019   Updated Objective Findings:    Shoulder Flexion 160 deg Passively 165 deg   Shoulder Abduction 175 deg Passively 165 deg   Shoulder ER T2 55 deg   Shoulder IR (Behind the back) L1 60 deg      TREATMENT:   THERAPEUTIC EXERCISE: (30 minutes):  Exercises per grid below to improve mobility, strength and balance. Required minimal visual, verbal and manual cues to promote proper body alignment and promote proper body posture. Progressed resistance and complexity of movement as indicated.      Date:  19 Date:   Date:     Activity/Exercise Parameters Parameters Parameters   UBE Level 1, 3 minutes backwards      Rows 10x10\" Blue      Extension 10x10\" Blue      AROM  1# 10x2     ER IR      Bicep 6# 2x10       Shoulder flexion 10x10\" Blue      PNFD2 Green tube 10x10\"     Bent over row 10# 2x10     PNF D1 flexion Cable 10x2 7#      Row 13# 2x10           MANUAL THERAPY: (*10  minutes): Joint mobilization and Soft tissue mobilization was utilized and necessary because of the patient's restricted joint motion and restricted motion of soft tissue. Gentle PROM L shoulder all planes (20 minutes), gentle PAs, inferior grade PARISH+, APs grade IV+, STM L upper trap and posterior deltoid. Shoulder quadrant Grade IV+      MedBridge Portal  Treatment/Session Summary:  Began full ROM, full strength today per MD guidelines. Started UBE and TB strengthening. · Response to Treatment:  No increase in pain or adverse reactions  · Communication/Consultation:  Faxed initial evaluation to MD  · Equipment provided today:  HEP. Recommendations/Intent for next treatment session: Next visit will focus on ROM and return to PLOF.     Total Treatment Billable Duration:  40 Rx  PT Patient Time In/Time Out  Time In: 6244  Time Out: CODY Mendez    Future Appointments   Date Time Provider Silvana Cotei   7/10/2019  8:45 AM Kapil Perez DPT [facility]   7/12/2019  8:45 AM Kapil Perez DPT [facility]

## 2019-07-12 ENCOUNTER — HOSPITAL ENCOUNTER (OUTPATIENT)
Dept: PHYSICAL THERAPY | Age: 44
Discharge: HOME | End: 2019-07-12
Payer: OTHER MISCELLANEOUS

## 2019-07-12 PROCEDURE — 97110 THERAPEUTIC EXERCISES: CPT

## 2019-07-12 PROCEDURE — 97140 MANUAL THERAPY 1/> REGIONS: CPT

## 2019-07-12 NOTE — PROGRESS NOTES
Olivia Alen  : 1975  Payor: Carlos Ronaldo / Plan: [address] / Product Type: Workers Comp /  [address] [facility] [address] at [address] [facility], [address], [facility], [address] [place] [address] Road  [phone]   [phone]                                                            ICD-10: Treatment Diagnosis:   Pain in left shoulder (M25.512)  Bursitis of left shoulder (M75.52)            Strain of muscle(s) and tendon(s) of the rotator cuff of left shoulder, sequela (S46.012S)                 Precautions/Allergies:   Patient has no known allergies. *  TREATMENT PLAN:  Effective Dates: 2019 TO 2019 (90 days). Frequency/Duration: 2 times a week for 90 Day(s) MEDICAL/REFERRING DIAGNOSIS:  s/p left shoulder open revision biceps tenodesis   DATE OF ONSET: 19  REFERRING PHYSICIAN: Ilya Ortiz MD MD Orders: Jose Antonio Olsen and Treat --Full strengthening, Full ROM   Return MD Appointment:               OUTPATIENT PHYSICAL THERAPY: Daily Treatment Note 2019 Visit Count:  4    Pre-treatment Symptoms/Complaints:  *Shoulder tightness L UE*  Felt tired after last session, but overall doing good. Pain: Initial:   *1/10 Post Session:  0/10   Medications Last Reviewed:  2019   Updated Objective Findings:    Shoulder Flexion 160 deg Passively 165 deg   Shoulder Abduction 175 deg Passively 165 deg   Shoulder ER T2 55 deg   Shoulder IR (Behind the back) L1 60 deg      TREATMENT:   THERAPEUTIC EXERCISE: (30 minutes):  Exercises per grid below to improve mobility, strength and balance. Required minimal visual, verbal and manual cues to promote proper body alignment and promote proper body posture. Progressed resistance and complexity of movement as indicated.      Date:  19 Date:  19 Date:     Activity/Exercise Parameters Parameters Parameters   UBE Level 1, 3 minutes backwards  Level 10 3 minutes E Fwd/Bwd    Rows 10x10\" Blue  10x10\" Blue     Extension 10x10\" Blue  10x10\" Blue    AROM  1# 10x2 3# 10x2    ER      IR      Bicep 6# 2x10   8# 2x10      Shoulder flexion 10x10\" Blue  10x10\" Blue     PNFD2 Green tube 10x10\" Green tube 10x10\"    Bent over row 10# 2x10 10# 2x10    PNF D1 flexion Cable 10x2 7#  Cable 10x2 7#     Row 13# 2x10 13# 2x10    High pull   27# 2x10    Tricep push out                   MANUAL THERAPY: (*10  minutes): Joint mobilization and Soft tissue mobilization was utilized and necessary because of the patient's restricted joint motion and restricted motion of soft tissue. Gentle PROM L shoulder all planes, gentle PAs, inferior grade PARISH+, APs grade IV+, STM L upper trap and posterior deltoid. Shoulder quadrant Grade IV+      MedBridge Portal  Treatment/Session Summary:  Began full ROM, full strength today per MD guidelines. Continued UBE and TB strengthening. Challendge with 6# DB bicep. · Response to Treatment:  No increase in pain or adverse reactions  · Communication/Consultation:  Faxed initial evaluation to MD  · Equipment provided today:  HEP. Recommendations/Intent for next treatment session: Next visit will focus on ROM and return to PLOF. Total Treatment Billable Duration:  40 Rx  PT Patient Time In/Time Out  Time In: 7554  Time Out: [address], DPT    No future appointments.

## 2019-07-16 NOTE — PROGRESS NOTES
Kareem Todd  : 1975  Payor: Mc Bernardino / Plan: [address] / Product Type: Sydnie Reyes /  Wei Scherer at [address] [facility], [facility] [place], [address] [place] [address]  [phone]   [phone]                                                            ICD-10: Treatment Diagnosis:   Pain in left shoulder (M25.512)  Bursitis of left shoulder (M75.52)            Strain of muscle(s) and tendon(s) of the rotator cuff of left shoulder, sequela (S46.012S)                 Precautions/Allergies:   Patient has no known allergies. *  TREATMENT PLAN:  Effective Dates: 2019 TO 2019 (90 days). Frequency/Duration: 2 times a week for 90 Day(s) MEDICAL/REFERRING DIAGNOSIS:  s/p left shoulder open revision biceps tenodesis   DATE OF ONSET: 19  REFERRING PHYSICIAN: Mily Ortiz MD MD Orders: Polo Glance and Treat --Full strengthening, Full ROM   Return MD Appointment:               OUTPATIENT PHYSICAL THERAPY: Daily Treatment Note 2019 Visit Count:  5    Pre-treatment Symptoms/Complaints:  *Shoulder tightness L UE*  -- \"It's 100% better than the first surgery. \"  Pain: Initial:   *110 Post Session:  0/10   Medications Last Reviewed:  2019   Updated Objective Findings:    Shoulder Flexion 160 deg Passively 165 deg   Shoulder Abduction 175 deg Passively 165 deg   Shoulder ER T2 55 deg   Shoulder IR (Behind the back) L1 60 deg      TREATMENT:   THERAPEUTIC EXERCISE: (30 minutes):  Exercises per grid below to improve mobility, strength and balance. Required minimal visual, verbal and manual cues to promote proper body alignment and promote proper body posture. Progressed resistance and complexity of movement as indicated.      Date:  19 Date:  19 Date:  19   Activity/Exercise Parameters Parameters Parameters   UBE Level 1, 3 minutes backwards  Level 10 3 minutes E Fwd/Bwd Level 11 3min   Rows 10x10\" Blue  10x10\" Blue  10x10\"Blue    Extension 10x10\" Blue  10x10\" Blue 10x10\" Blue   AROM  1# 10x2 3# 10x2    ER   10x10\" green and blue   IR   10x10\" green and blue   Bicep 6# 2x10   8# 2x10   8# 2x10   Shoulder flexion 10x10\" Blue  10x10\" Blue  1   PNFD2 Green tube 10x10\" Green tube 10x10\"    Bent over row 10# 2x10 10# 2x10 15# 2x10   PNF D1 flexion Cable 10x2 7#  Cable 10x2 7#  Cable 10x2 7#   Row 13# 2x10 13# 2x10    High pull   27# 2x10 27 # 2x10   Tricep push out    nv   Skull    7# 2x10   Bicep curl standing and upright   Green 10x10\"          MANUAL THERAPY: (  minutes): Joint mobilization and Soft tissue mobilization was utilized and necessary because of the patient's restricted joint motion and restricted motion of soft tissue. Gentle PROM L shoulder all planes, gentle PAs, inferior grade PARISH+, APs grade IV+, STM L upper trap and posterior deltoid. Shoulder quadrant Grade IV+      MedBridge Portal  Treatment/Session Summary:  Began full ROM, full strength today per MD guidelines. Continued strengthening--he was 15 minutes late to session. Added stannding bicep curls upright. · Response to Treatment:  No increase in pain or adverse reactions  · Communication/Consultation:  Faxed initial evaluation to MD  · Equipment provided today:  HEP. Recommendations/Intent for next treatment session: Next visit will focus on ROM and return to PLOF.     Total Treatment Billable Duration:  30 Rx  PT Patient Time In/Time Out  Time In: 0900  Time Out: Donato Arriola DPT    Future Appointments   Date Time Provider Silvana Weiner   7/19/2019 11:15 AM Serena Jc DPT [facility]

## 2019-07-17 ENCOUNTER — HOSPITAL ENCOUNTER (OUTPATIENT)
Dept: PHYSICAL THERAPY | Age: 44
Discharge: HOME | End: 2019-07-17
Payer: OTHER MISCELLANEOUS

## 2019-07-17 PROCEDURE — 97110 THERAPEUTIC EXERCISES: CPT

## 2019-07-19 ENCOUNTER — HOSPITAL ENCOUNTER (OUTPATIENT)
Dept: PHYSICAL THERAPY | Age: 44
Discharge: HOME | End: 2019-07-19
Payer: OTHER MISCELLANEOUS

## 2019-07-19 PROCEDURE — 97110 THERAPEUTIC EXERCISES: CPT

## 2019-07-19 NOTE — PROGRESS NOTES
Kwesi Rodríguez  : 1975  Payor: Tramaine Delong / Plan: [address] / Product Type: Workers Comp /  [address] [facility] [address] at [address], [address], [facility], [address] [place] [address] Road  [phone]   [phone]       OUTPATIENT PHYSICAL THERAPY:Progress Assessment 2019   ICD-10: Treatment Diagnosis:   Pain in left shoulder (M25.512)  Bursitis of left shoulder (M75.52)         Strain of muscle(s) and tendon(s) of the rotator cuff of left shoulder, sequela (B97.514C)             Precautions/Allergies:   Patient has no known allergies. *  TREATMENT PLAN:  Effective Dates: 2019 TO 2019 (90 days). Frequency/Duration: 2 times a week for 90 Day(s) MEDICAL/REFERRING DIAGNOSIS:  s/p left shoulder open revision biceps tenodesis   DATE OF ONSET: 19  REFERRING PHYSICIAN: Eli Ortiz MD MD Orders: Fam Priest and Treat   Return MD Appointment: 2019. Kwesi Rodríguez has attended 6 visit including initial evaluation. Overall he has full shoulder flexion and abduction AROM, but lacks functional strength and IR/ER ROM at this time. He is progressing towards goals. LEFT  Shoulder Flexion Actively 165 deg   Shoulder Abduction Actively 165 deg   Shoulder ER Top of head, 50 deg   Shoulder IR (Behind the back) L5, *58 deg           Kwesi Rodríguez will benefit from skilled PT (medically necessary) to address above deficits affecting participation in basic ADLs and overall functional tolerance. PROBLEM LIST (Impacting functional limitations):  1. Decreased Strength  2. Decreased ADL/Functional Activities  3. Decreased Ambulation Ability/Technique  4. Decreased Balance  5. Increased Pain  6. Decreased Activity Tolerance  7. Decreased Flexibility/Joint Mobility  8.  Decreased [place] with Home Exercise Program  INTERVENTIONS PLANNED: (Treatment may consist of any combination of the following)  1. Balance Exercise  2. Cold  3. Cryotherapy  4. Electrical Stimulation  5. Heat  6. Home Exercise Program (HEP)  7. Manual Therapy  8. Neuromuscular Re-education/Strengthening  9. Range of Motion (ROM)  10. Therapeutic Activites  11. Therapeutic Exercise/Strengthening  12. Transcutaneous Electrical Nerve Stimulation (TENS)  13. Ultrasound (US)     GOALS: (Goals have been discussed and agreed upon with patient.)  SHORT-TERM FUNCTIONAL GOALS: Time Frame: 4 weeks  1. Mani Haskins will report <=0-1/10 pain with don/doffing clothing as well as minimal/no difficulty. GOAL MET 7/19/2019   2. Mani Haskins will demonstrate improvement in active shoulder LEFT flexion to >165 degrees to increase UE function and participation in ADLs. GOAL MET 7/19/2019   3. Mani Haskins will demonstrate demonstrate improvement in active shoulder abduction to >165 degrees to increase UE function and participation in ADLs. GOAL MET 7/19/2019   4. Mani Haskins will show a greater than 8 point decrease on the DASH in order to show an increase in upper extremity function. 5. Mani Haskins will be independent in all HEP GOAL MET 7/19/2019     DISCHARGE GOALS: Time Frame: 8 weeks    1. Mani Haskins will show full ROM of the UE in order to return to full functional mobility  GOAL MET 7/19/2019   2. Mani Haskins will show a greater than 15 point decrease on the DASH in order to show an increase in upper extremity function  3. Mani Haskins will report doing hair/bathing without difficulty and <=2/10 pain in order to be independent with ADL's GOAL MET 7/19/2019   4. Mani Haskins will be independent in all advanced HEP   OUTCOME MEASURE:   Tool Used: Disabilities of the Arm, Shoulder and Hand (DASH) Questionnaire - Quick Version  Score:  Initial: 38/55  Most Recent: X/55 (Date: -- )   Interpretation of Score:  The DASH is designed to measure the activities of daily living in person's with upper extremity dysfunction or pain. Each section is scored on a 1-5 scale, 5 representing the greatest disability. The scores of each section are added together for a total score of 55. MEDICAL NECESSITY:   · Skilled intervention continues to be required due to above deficits affecting participation in ADLs and overall QOL. REASON FOR SERVICES/OTHER COMMENTS:  · Patient continues to require skilled intervention due to patient unable to attend/participate in therapy as expected    Total Duration:  PT Patient Time In/Time Out  Time In: 0800  Time Out: 0845    Rehabilitation Potential For Stated Goals: Good  Regarding Jose Angel Cadena therapy, I certify that the treatment plan above will be carried out by a therapist or under their direction.   Thank you for this referral,  Eddie Bhatia DPT     Referring Physician Signature: Cain Whaley MD _     EXAMINATION:   Observation/Orthostatic Postural Assessment:          Rounded shoulders  Palpation:          Incision sites tender              UPPER QUADRANT SCREEN Eval Date: 6.28.19  Re-Assess Date:  Re-Assess Date:     RIGHT LEFT RIGHT LEFT RIGHT LEFT              Cervical AROM                Flexion WFL               Extension [facility] [place]               Rotation [facility] WFL              Side Bending WFL WFL       Shoulder Flexion 160 deg Passively 160 deg  165     Shoulder Abduction 175 deg Passively 160 deg  165     Shoulder ER T2 Top of head, 40 deg  50 deg, T2      Shoulder IR (Behind the back) L1 Buttock, passively *58 deg  L5     Elbow Extension   WFL       Elbow Flexion  WFL       Wrist Extension         Wrist Flexion          Resisted Shoulder Shrug (C2,C3, C4)         Resisted Shoulder Flexion 5/5 NT  4+/5      Resisted  Shoulder Abduction (C5) 5/5 NT  4+/5     Resisted Shoulder IR 5/5 NT  4+/5     Resisted Shoulder ER 5/5 NT  4+/5     Resisted Elbow Flexion (C6)    Wrist Extension (C6) 5/5    5/5 3/5    3/5  4+/5    4+/5     Resisted Elbow Extension (C7)    Wrist Flexion (C7) 5/5    5/5 5/5    5/5  5/5    5/5     Resisted Thumb Extension (C8)         Resisted Finger Abduction (T1)         Sensory Examination (Dematomes)   C2 Head, C3 Mastoid, C4 Base of neck, UT, C5 Deltoid insertion, C6 Thumb, C7 Distal Middle Finger, C8 Pinky, T1 Armpit      Reflex Testing      Biceps      Brachioradialis     Triceps    Upper Motor Neuron Tests (if necessary: Maldonados, Babinski, Clonus, Supra Patella, Inverted Supinator Sign)                         Functional Mobility:  Assessed @ Initial Visit   Balance:  Assessed @ Initial Visit

## 2019-09-12 NOTE — PROGRESS NOTES
Kristina Gardner  : 1975  Payor: Chalino Islas / Plan: [address] / Product Type: Sydnie Reyes /  Bhavin Boudreaux at [address], [place], [address] [address]  [phone]   [phone]       OUTPATIENT PHYSICAL THERAPY:Discharge Assessment 2019   ICD-10: Treatment Diagnosis:   Pain in left shoulder (M25.512)  Bursitis of left shoulder (M75.52)         Strain of muscle(s) and tendon(s) of the rotator cuff of left shoulder, sequela (H12.679D)             Precautions/Allergies:   Patient has no known allergies. *  TREATMENT PLAN:  Effective Dates: 2019 TO 2019 (90 days). Frequency/Duration: 2 times a week for 90 Day(s) MEDICAL/REFERRING DIAGNOSIS:  s/p left shoulder open revision biceps tenodesis   DATE OF ONSET: 19  REFERRING PHYSICIAN: Shawn Ortiz MD MD Orders: Hanh Sellers and Treat   Return MD Appointment: 2019. Kristina Gardner has attended 6 visit including initial evaluation. Overall he has full shoulder flexion and abduction AROM, but lacks functional strength and IR/ER ROM at this time. He is progressing towards goals. He did not return for appointments after 19 and case will be DC'd                                                                                        LEFT  Shoulder Flexion Actively 165 deg   Shoulder Abduction Actively 165 deg   Shoulder ER Top of head, 50 deg   Shoulder IR (Behind the back) L5, *58 deg           Kristina Gardner will benefit from skilled PT (medically necessary) to address above deficits affecting participation in basic ADLs and overall functional tolerance. PROBLEM LIST (Impacting functional limitations):  1. Decreased Strength  2. Decreased ADL/Functional Activities  3. Decreased Ambulation Ability/Technique  4. Decreased Balance  5. Increased Pain  6. Decreased Activity Tolerance  7. Decreased Flexibility/Joint Mobility  8.  Decreased [place] with Home Exercise Program  INTERVENTIONS PLANNED: (Treatment may consist of any combination of the following)  1. Balance Exercise  2. Cold  3. Cryotherapy  4. Electrical Stimulation  5. Heat  6. Home Exercise Program (HEP)  7. Manual Therapy  8. Neuromuscular Re-education/Strengthening  9. Range of Motion (ROM)  10. Therapeutic Activites  11. Therapeutic Exercise/Strengthening  12. Transcutaneous Electrical Nerve Stimulation (TENS)  13. Ultrasound (US)     GOALS: (Goals have been discussed and agreed upon with patient.)  SHORT-TERM FUNCTIONAL GOALS: Time Frame: 4 weeks  1. Kristina Mode will report <=0-1/10 pain with don/doffing clothing as well as minimal/no difficulty. GOAL MET 9/12/2019   2. Kristina Mode will demonstrate improvement in active shoulder LEFT flexion to >165 degrees to increase UE function and participation in ADLs. GOAL MET 7/19/2019   3. Kristina Mode will demonstrate demonstrate improvement in active shoulder abduction to >165 degrees to increase UE function and participation in ADLs. GOAL MET 7/19/2019   4. Kristina Mode will show a greater than 8 point decrease on the DASH in order to show an increase in upper extremity function. 5. Kristina Mode will be independent in all HEP GOAL MET 7/19/2019     DISCHARGE GOALS: Time Frame: 8 weeks    1. Kristina Mode will show full ROM of the UE in order to return to full functional mobility  GOAL MET 7/19/2019   2. Kristina Mode will show a greater than 15 point decrease on the DASH in order to show an increase in upper extremity function  3. Kristina Mode will report doing hair/bathing without difficulty and <=2/10 pain in order to be independent with ADL's GOAL MET 7/19/2019   4. Kristina Mode will be independent in all advanced HEP   OUTCOME MEASURE:   Tool Used: Disabilities of the Arm, Shoulder and Hand (DASH) Questionnaire - Quick Version  Score:  Initial: 38/55  Most Recent: X/55 (Date: -- )   Interpretation of Score:  The DASH is designed to measure the activities of daily living in person's with upper extremity dysfunction or pain. Each section is scored on a 1-5 scale, 5 representing the greatest disability. The scores of each section are added together for a total score of 55. Total Duration:  PT Patient Time In/Time Out  Time In: 0800  Time Out: 0845    Rehabilitation Potential For Stated Goals: Good  Regarding Osiris Hinojosa therapy, I certify that the treatment plan above will be carried out by a therapist or under their direction.   Thank you for this referral,  Basil Peabody, DPT     Referring Physician Signature: Malka Mendosa MD _     EXAMINATION:   Observation/Orthostatic Postural Assessment:          Rounded shoulders  Palpation:          Incision sites tender              UPPER QUADRANT SCREEN Eval Date: 6.28.19  Re-Assess Date:  Re-Assess Date:     RIGHT LEFT RIGHT LEFT RIGHT LEFT              Cervical AROM                Flexion WFL               Extension [facility] [place]               Rotation [facility] WFL              Side Bending WFL WFL       Shoulder Flexion 160 deg Passively 160 deg  165     Shoulder Abduction 175 deg Passively 160 deg  165     Shoulder ER T2 Top of head, 40 deg  50 deg, T2      Shoulder IR (Behind the back) L1 Buttock, passively *58 deg  L5     Elbow Extension   WFL       Elbow Flexion  WFL       Wrist Extension         Wrist Flexion          Resisted Shoulder Shrug (C2,C3, C4)         Resisted Shoulder Flexion 5/5 NT  4+/5      Resisted  Shoulder Abduction (C5) 5/5 NT  4+/5     Resisted Shoulder IR 5/5 NT  4+/5     Resisted Shoulder ER 5/5 NT  4+/5     Resisted Elbow Flexion (C6)    Wrist Extension (C6) 5/5    5/5 3/5    3/5  4+/5    4+/5     Resisted Elbow Extension (C7)    Wrist Flexion (C7) 5/5    5/5 5/5    5/5  5/5    5/5     Resisted Thumb Extension (C8)         Resisted Finger Abduction (T1)         Sensory Examination (Dematomes)   C2 Head, C3 Mastoid, C4 Base of neck, UT, C5 Deltoid insertion, C6 Thumb, C7 Distal Middle Finger, C8 Pinky, T1 Armpit      Reflex Testing      Biceps      Brachioradialis     Triceps    Upper Motor Neuron Tests (if necessary: Maldonados, Babinski, Clonus, Supra Patella, Inverted Supinator Sign)                         Functional Mobility:  Assessed @ Initial Visit   Balance:  Assessed @ Initial Visit

## 2019-09-25 ENCOUNTER — HOSPITAL ENCOUNTER (OUTPATIENT)
Dept: PHYSICAL THERAPY | Age: 44
Discharge: HOME | End: 2019-09-25
Payer: COMMERCIAL

## 2019-09-27 ENCOUNTER — HOSPITAL ENCOUNTER (OUTPATIENT)
Dept: PHYSICAL THERAPY | Age: 44
Discharge: HOME | End: 2019-09-27
Payer: COMMERCIAL

## 2019-09-27 PROCEDURE — 97161 PT EVAL LOW COMPLEX 20 MIN: CPT

## 2019-09-27 PROCEDURE — 97110 THERAPEUTIC EXERCISES: CPT

## 2019-09-27 NOTE — PROGRESS NOTES
Craig Chaudhry  : 1975  Payor: Yung Snare / Plan: [address] / Product Type: Workers Comp /  [address] at [facility] [address], [place], [address] [address]  [phone]   [phone]       OUTPATIENT PHYSICAL THERAPY:Initial Assessment 2019   ICD-10: Treatment Diagnosis:   Pain in left shoulder (M25.512)  Bursitis of left shoulder (M75.52)           Muscle weakness (generalized) (M62.81)       Precautions/Allergies:   Patient has no known allergies. TREATMENT PLAN:  Effective Dates: 2019 TO 2019 (90 days). Frequency/Duration: 2-3 times a week for 90 Day(s) MEDICAL/REFERRING DIAGNOSIS:  s/p left shoulder open revision biceps tenodesis   DATE OF ONSET: *19  REFERRING PHYSICIAN: Dulce Maria Ortiz MD MD Orders: aMrian Goldstein and Treat  Return MD Appointment: 2019. INITIAL ASSESSMENT:  Mr. Shade Kent presents S/P open biceps tenodesis revision 19. He participated with PT the end of last  (mainly [facility]), and then did not show after appointment on 19 due to difficulty making PT times with new job. He returned to MD recently and has an order for continued therapy for L shoulder. Overall he has had extremely poor compliance with PT in the past--arrived late to sessions and occasionally no showed. Hopefully this will improve this bout of therapy. Patient states that he is having difficulty mainly with mobility. Current ROM is as seen below:                                                                                             RIGHT                       LEFT  Shoulder Flexion 175 deg 145 deg   Shoulder Abduction 175 deg 132 deg   Shoulder ER T7 L5   Shoulder IR (Behind the back) T3 T2           Craig Chaudhry will benefit from skilled PT (medically necessary) to address above deficits affecting participation in basic ADLs and overall functional tolerance.      PROBLEM LIST (Impacting functional limitations):  1. Decreased Strength  2. Decreased ADL/Functional Activities  3. Decreased Ambulation Ability/Technique  4. Decreased Balance  5. Increased Pain  6. Decreased Activity Tolerance  7. Decreased Flexibility/Joint Mobility  8. Decreased [place] with Home Exercise Program INTERVENTIONS PLANNED: (Treatment may consist of any combination of the following)  1. Balance Exercise  2. Cold  3. Cryotherapy  4. Electrical Stimulation  5. Heat2  6. Home Exercise Program (HEP)  7. Manual Therapy  8. Neuromuscular Re-education/Strengthening  9. Range of Motion (ROM)  10. Therapeutic Activites  11. Therapeutic Exercise/Strengthening  12. Transcutaneous Electrical Nerve Stimulation (TENS)  13. Ultrasound (US)     GOALS: (Goals have been discussed and agreed upon with patient.)  SHORT-TERM FUNCTIONAL GOALS: Time Frame: 4 weeks  1. Ceci [place] will report <=1/10 pain with don/doffing clothing as well as minimal/no difficulty. 2. Ceci [place]ryn will demonstrate improvement in active shoulder flexion to >165 degrees to increase UE function and participation in ADLs. 3. Ceci [place]ryn will demonstrate demonstrate improvement in active shoulder abduction  to >160 degrees to increase UE function and participation in ADLs. 4.  Ceci Kamryn will show a greater than 8 point decrease on the DASH in order to show an increase in upper extremity function. 5. Ceci [place] will be independent in all HEP    DISCHARGE GOALS: Time Frame: 8 weeks    1. Ceci [place] will show full ROM of the UE in order to return to full functional mobility   2. Ceci [place] will show a greater than 15 point decrease on the DASH in order to show an increase in upper extremity function  3. Ceci [place] will report putting on clothing and drying off with towel without difficulty and <=2/10 pain in order to be independent with ADL's  4.  Ceci Kamryn will be independent in all advanced HEP   OUTCOME MEASURE:   Tool Used: Disabilities of the Arm, Shoulder and Hand (DASH) Questionnaire - Quick Version  Score:  Initial: 36/55  Most Recent: X/55 (Date: -- )   Interpretation of Score: The DASH is designed to measure the activities of daily living in person's with upper extremity dysfunction or pain. Each section is scored on a 1-5 scale, 5 representing the greatest disability. The scores of each section are added together for a total score of 55. MEDICAL NECESSITY:   · Skilled intervention continues to be required due to above deficits affecting participation in ADLs and overall QOL. REASON FOR SERVICES/OTHER COMMENTS:  · Patient continues to require skilled intervention due to patient unable to attend/participate in therapy as expected  · . Total Duration:       Rehabilitation Potential For Stated Goals: Fair due to lack of compliance in the past and [facility] not arriving on time for evaluation this morning. Regarding Isac Mehta therapy, I certify that the treatment plan above will be carried out by a therapist or under their direction. Thank you for this referral,  BEATRIZ SchulzT     Referring Physician Signature: Kamran Gallagher MD _______________________________ Date _____________     PAIN/SUBJECTIVE:   Initial:   *4-5/10 to bicep and shoulder joint Post Session:  4/10   HISTORY:   History of Injury/Illness (Reason for Referral):  *[facility] had surgery last June of which he performed PT. He stopped coming to PT 7/7/2019 for L shoulder due to \"change in employment\". He arrives today (9/27/19) for continued therapy on L shoulder. He saw MD 8th September and MD recommended continued PT        · Aggravating factors: lifting OH overhead   · Relieving factors: Not moving. ·   Past Medical History/Comorbidities:   Mr. Ngoc Allen  has a past medical history of Degenerative joint disease of left acromioclavicular joint (5/5/2018), Diabetes ([address] (2008), Hypertension, Severe obesity (BMI 35.0-39. 9) (5/17/2018), Strain of muscle(s) and tendon(s) of the rotator cuff of left shoulder, initial encounter, and Strain of muscle, fascia and tendon of long head of biceps, left arm, initial encounter. He also has no past medical history of Adverse effect of anesthesia, Aneurysm ([address] Arrhythmia, Asthma, Autoimmune disease ([address] CAD (coronary artery disease), Cancer ([address] Chronic kidney disease, Chronic obstructive pulmonary disease ([address] Chronic pain, Coagulation disorder ([address] Difficult intubation, Endocarditis, GERD (gastroesophageal reflux disease), Heart failure ([address] Ill-defined condition, Liver disease, Malignant hyperthermia due to anesthesia, Nausea & vomiting, Nicotine vapor product user, Non-nicotine vapor product user, Pseudocholinesterase deficiency, Psychiatric disorder, PUD (peptic ulcer disease), Rheumatic fever, Seizures ([address] Sleep apnea, Stroke ([address] Thromboembolus ([address] or Thyroid disease. Mr. Araceli Duncan  has a past surgical history that includes hx hernia repair (2013); hx acl reconstruction (Left, 2014); and hx shoulder arthroscopy (05/2018).   Active Ambulatory Problems     Diagnosis Date Noted    Strain of muscle, fascia and tendon of long head of biceps, left arm, initial encounter 05/05/2018    Superior glenoid labrum lesion of left shoulder 05/05/2018    Degenerative joint disease of left acromioclavicular joint 05/05/2018    Hypomagnesemia 05/11/2018    Strain of muscle(s) and tendon(s) of the rotator cuff of left shoulder, initial encounter     Hypertension     Severe obesity (BMI 35.0-39.9) 05/17/2018    Hypertriglyceridemia 05/22/2018    Low testosterone in male 05/22/2018    Type 2 diabetes mellitus with complication, with long-term current use of insulin ([address] 06/01/2018    Hypogonadotropic hypogonadism in male [facility]) 06/01/2018    Mixed hyperlipidemia 09/26/2018    Rupture of left long head biceps tendon 05/18/2019     Resolved Ambulatory Problems     Diagnosis Date Noted  Traumatic tear of left rotator cuff 05/05/2018    Uncontrolled type II diabetes mellitus ([facility] 05/10/2018    HTN (hypertension) 05/10/2018    Diabetes ([facility] 01/01/2008     No Additional Past Medical History       Social History/Living Environment:        Social History     Socioeconomic History    Marital status:      Spouse name: Not on file    Number of children: Not on file    Years of education: Not on file    Highest education level: Not on file   Occupational History    Not on file   Social Needs    Financial resource strain: Not on file    Food insecurity:     Worry: Not on file     Inability: Not on file    Transportation needs:     Medical: Not on file     Non-medical: Not on file   Tobacco Use    Smoking status: Never Smoker    Smokeless tobacco: Never Used   Substance and Sexual Activity    Alcohol use: No    Drug use: No    Sexual activity: Yes     Partners: Female     Comment: single-monogamous   Lifestyle    Physical activity:     Days per week: Not on file     Minutes per session: Not on file    Stress: Not on file   Relationships    Social connections:     Talks on phone: Not on file     Gets together: Not on file     Attends Jainism service: Not on file     Active member of club or organization: Not on file     Attends meetings of clubs or organizations: Not on file     Relationship status: Not on file    Intimate partner violence:     Fear of current or ex partner: Not on file     Emotionally abused: Not on file     Physically abused: Not on file     Forced sexual activity: Not on file   Other Topics Concern    Not on file   Social History Narrative    Not on file      Prior Level of Function/Work/Activity:  Independent, reports being active with weight lifting prior to surgery  Personal Factors:          Sex:  male        Age:  40 y.o.    Ambulatory/Rehab Services H2 Model Falls Risk Assessment   Risk Factors:       (1)  Gender [Male] Ability to Rise from Chair:       (0)  Ability to rise in a single movement   Falls Prevention Plan:       No modifications necessary   Total: (5 or greater = High Risk): 1   ©2010 [facility] of [facility] [facility] Patent #[id number]. Federal Law prohibits the replication, distribution or use without written permission from [facility] of [employer]   Current Medications:       Current Outpatient Medications:     insulin glargine (LANTUS SOLOSTAR U-100 INSULIN) 100 unit/mL (3 mL) inpn, 50 Units by SubCUTAneous route daily. Indications: type 2 diabetes mellitus, Disp: , Rfl:     multivitamin (ONE A DAY) tablet, Take 1 Tab by mouth daily. , Disp: , Rfl:     Syringe, Disposable, 1 mL syrg, Use 1 every 2 weeks with Testosterone Cypionate, E29.1, Disp: 12 Syringe, Rfl: 3    Safety Needles 18 gauge x 1\" ndle, Use 1 every 2 weeks with Testosterone Cypionate, E29.1, Disp: 12 Pen Needle, Rfl: 3    Needle, Disp, 23 G 23 gauge x 1 1/2\" ndle, Use 1 every 2 weeks with Testosterone Cypionate, E29.1, Disp: 12 Each, Rfl: 3    OZEMPIC 0.25 mg or 0.5 mg(2 mg/1.5 mL) sub-q pen, 0.5 mg by SubCUTAneous route every seven (7) days. , Disp: 2 Box, Rfl: 3    lisinopril (PRINIVIL, ZESTRIL) 20 mg tablet, Take 1 Tab by mouth daily. , Disp: 90 Tab, Rfl: 1    rosuvastatin (CRESTOR) 20 mg tablet, Take 1 Tab by mouth nightly., Disp: 90 Tab, Rfl: 3    metFORMIN ER 1,000 mg tr24, Take 1 Tab by mouth daily. , Disp: 90 Tab, Rfl: 3    diclofenac (VOLTAREN) 1 % gel, APPLY 2 GRAM(S) TRANSDERMAL EVERY 6 HOURS FOR 30 DAYS, Disp: , Rfl: 3    Blood Pressure Monitor kit, Use to monitor BP daily, large cuff, Disp: 1 Kit, Rfl: 0    NOVOLOG FLEXPEN U-100 INSULIN 100 unit/mL inpn, Use with correction 2/50 >150, max daily dose 30 units, Disp: 10 Pen, Rfl: 3   Date Last Reviewed:  9/27/2019    Number of Personal Factors/Comorbidities that affect the Plan of Care: 3+: HIGH COMPLEXITY   EXAMINATION:   Observation/Orthostatic Postural Assessment: Rounded shoulders  Palpation:          *Biceps insertion LH and L pec rebeca/minor              UPPER QUADRANT SCREEN Eval Date: 9/27/19  Re-Assess Date:  Re-Assess Date:     RIGHT LEFT RIGHT LEFT RIGHT LEFT              Cervical AROM denies neck pain                Flexion WFL               [facility] [place]               Rotation [facility] WFL              Side Bending WFL WFL       Shoulder Flexion 175 deg 145 deg       Shoulder Abduction 175 deg 132 deg       Shoulder ER T7 L5       Shoulder IR (Behind the back) T3 T2       Elbow/Wrist Extension          Elbow/Wrist Flexion          Resisted Shoulder Shrug (C2,C3, C4)         Resisted Flexion 5/5 4+/5       Resisted Abduction (C5) 5/5 4+/5       Resisted IR 5/5 5/5       Resisted ER 5/5 4+/5       Resisted Elbow Flexion (C6)    Wrist Extension (C6) 5/5    5/5 5/5    5/5       Resisted Elbow Extension (C7)    Wrist Flexion (C7) 5/5    5/5 5/5    5/5       Resisted Thumb Extension (C8)         Resisted Finger Abduction (T1)                         Functional Mobility:  Assessed @ Initial Visit   Balance:  Assessed @ Initial Visit    Body Structures Involved:  1. Nerves  2. Bones  3. Joints  4. Muscles  5. Ligaments Body Functions Affected:  1. Sensory/Pain  2. Cardio  3. Neuromusculoskeletal  4. Movement Related  5. Skin Related Activities and Participation Affected:  1. Learning and Applying Knowledge  2. General Tasks and Demands  3. Communication  4. Mobility  5.  Self Care   Number of elements (examined above) that affect the Plan of Care: 4+: HIGH COMPLEXITY   CLINICAL PRESENTATION:   Presentation: Stable and uncomplicated: LOW COMPLEXITY   CLINICAL DECISION MAKING:   Use of outcome tool(s) and clinical judgement create a POC that gives a: Clear prediction of patient's progress: LOW COMPLEXITY

## 2019-09-27 NOTE — PROGRESS NOTES
Reese Wood  : 1975  Payor: Lynsey North / Plan: [address] / Product Type: Workers Comp /  [address] [facility] at [place] [address] [address], [place], [address] [place] [address] Road  [phone]   [phone]                                                               OUTPATIENT PHYSICAL THERAPY: Daily Treatment Note 2019   Visit Count:  1    ICD-10: Treatment Diagnosis:   Pain in left shoulder (M25.512)  Bursitis of left shoulder (M75.52)           Muscle weakness (generalized) (M62.81)       Precautions/Allergies:   Patient has no known allergies. TREATMENT PLAN:  Effective Dates: 2019 TO 2019 (90 days). Frequency/Duration: 2-3 times a week for 90 Day(s) MEDICAL/REFERRING DIAGNOSIS:  s/p left shoulder open revision biceps tenodesis   DATE OF ONSET: *19  REFERRING PHYSICIAN: Devin Ortiz MD MD Orders: Lis Oneil and Treat  Return MD Appointment: *2019       Pre-treatment Symptoms/Complaints:  *Mobility he feels is lacking  Pain: Initial:   4-5 Post Session:  10   Medications Last Reviewed:  2019   Updated Objective Findings:  limited abduction, flexion L UE   TREATMENT:   Therapeutic Exercises: (*24minutes):  Exercises per grid below to improve mobility, strength and balance. Required minimal visual, verbal and manual cues to promote proper body alignment and promote proper body posture. Progressed resistance and complexity of movement as indicated. Date:  *19 Date:   Date:     Activity/Exercise Parameters Parameters Parameters   UBE 4/4 level 10     Pulleys 5 minutes warm up to address lack of flexion and abduction     Shrugs 20x 20#                                         [employer] [facility] Portal  Treatment/Session Summary:  Reese Wood verbalized understanding of role of PT and POC.     · Response to Treatment:  No increase in pain or adverse reactions  · Communication/Consultation:  Faxed initial evaluation to MD  · Equipment provided today:  HEP.   Recommendations/Intent for next treatment session: Next visit will focus on strengthening/functional activities    Total Treatment Billable Duration:  24 Rx  PT Patient Time In/Time Out  Time In: 0355  Time Out: Donato 44, DPT    Future Appointments   Date Time Provider Silvana Weiner   10/1/2019  8:45 AM CODY [facility]   10/2/2019  8:45 AM Juliet Cabral DPT [facility]   10/7/2019  1:45 PM Juliet Cabral DPT [facility]   10/9/2019  8:45 AM CODY Ford [facility]   10/11/2019  8:45 AM Juliet Cabral DPT [facility]

## 2019-10-01 ENCOUNTER — HOSPITAL ENCOUNTER (OUTPATIENT)
Dept: PHYSICAL THERAPY | Age: 44
Discharge: HOME | End: 2019-10-01
Payer: COMMERCIAL

## 2019-10-01 PROCEDURE — 97110 THERAPEUTIC EXERCISES: CPT

## 2019-10-01 NOTE — PROGRESS NOTES
David Bryant  : 1975  Payor: Clayton Ling / Plan: Yodit Alcocer / Product Type: Workers Comp /  [address] at [address], [address], [facility], [address] [place] [address]  [phone]   [phone]                                                               OUTPATIENT PHYSICAL THERAPY: Daily Treatment Note 10/1/2019   Visit Count:  6    ICD-10: Treatment Diagnosis:   Pain in left shoulder (M25.512)  Bursitis of left shoulder (M75.52)           Muscle weakness (generalized) (M62.81)       Precautions/Allergies:   Patient has no known allergies. TREATMENT PLAN:  Effective Dates: 2019 TO 2019 (90 days). Frequency/Duration: 2-3 times a week for 90 Day(s) MEDICAL/REFERRING DIAGNOSIS:  s/p left shoulder open revision biceps tenodesis   DATE OF ONSET: *19  REFERRING PHYSICIAN: Sade Ortiz.MD LOWE Orders: Judie Moreland and Treat  Return MD Appointment: *2019       Pre-treatment Symptoms/Complaints:  *Mobility he feels is lacking--he feels great overall though. He met with a  last Thursday and the  relieved lat tightness. Pain: Initial:   4-5 Post Session:  4/10   Medications Last Reviewed:  10/1/2019   Updated Objective Findings:  limited abduction, flexion L UE   TREATMENT:   Therapeutic Exercises: (*40 minutes):  Exercises per grid below to improve mobility, strength and balance. Required minimal visual, verbal and manual cues to promote proper body alignment and promote proper body posture. Progressed resistance and complexity of movement as indicated.      Date:  *19 Date:  10/1/19 Date:     Activity/Exercise Parameters Parameters Parameters   UBE /4 level 10 4/4 level 10    Pulleys 5 minutes warm up to address lack of flexion and abduction 5'    Shrugs 20x 20#     Finger ladder  5x    Wall ER to flexion OH  *12x with paddles    Bicep Curl  0#, 3x10 Bar FM    Tricep Pulldown  33# 3x10 FM     Rows  3x10 20#    High pull  30# 10x3                                  [employer] Portal  Treatment/Session Summary:  Danial Huston verbalized understanding of role of PT and POC. Progressed strengthening and mobility as seen above. · Response to Treatment:  No increase in pain or adverse reactions  · Communication/Consultation:  Faxed initial evaluation to MD  · Equipment provided today:  HEP.   Recommendations/Intent for next treatment session: Next visit will focus on strengthening/functional activities    Total Treatment Billable Duration:  40 Rx  PT Patient Time In/Time Out  Time In: 9318  Time Out: [address] [facility] Natalio Hung DPT    Future Appointments   Date Time Provider Silvana Weiner   10/2/2019  8:45 AM Lizette Garcias DPT [facility]   10/7/2019  1:45 PM CODY Lynn [facility]   10/9/2019  8:45 AM [facility]   10/11/2019  8:45 AM CODY Lynn [facility]

## 2019-10-02 ENCOUNTER — HOSPITAL ENCOUNTER (OUTPATIENT)
Dept: PHYSICAL THERAPY | Age: 44
Discharge: HOME | End: 2019-10-02
Payer: COMMERCIAL

## 2019-10-02 PROCEDURE — 97140 MANUAL THERAPY 1/> REGIONS: CPT

## 2019-10-02 PROCEDURE — 97110 THERAPEUTIC EXERCISES: CPT

## 2019-10-02 NOTE — PROGRESS NOTES
Sandeepshelton Alanisray  : 1975  Payor: Ruben Espinoza / Plan: Sophie Boss / Product Type: Workers Comp /  [address] at [address], [address], [facility], [address] [place] [address]  [phone]   [phone]                                                               OUTPATIENT PHYSICAL THERAPY: Daily Treatment Note 10/2/2019   Visit Count:  7    ICD-10: Treatment Diagnosis:   Pain in left shoulder (M25.512)  Bursitis of left shoulder (M75.52)           Muscle weakness (generalized) (M62.81)       Precautions/Allergies:   Patient has no known allergies. TREATMENT PLAN:  Effective Dates: 2019 TO 2019 (90 days). Frequency/Duration: 2-3 times a week for 90 Day(s) MEDICAL/REFERRING DIAGNOSIS:  s/p left shoulder open revision biceps tenodesis   DATE OF ONSET: *19  REFERRING PHYSICIAN: Zoe Ortiz MD MD Orders: Luis M Arellano and Treat  Return MD Appointment: *2019       Pre-treatment Symptoms/Complaints:  *Mobility feels like it is coming back well  Pain: Initial:   4-5 Post Session:  2/10   Medications Last Reviewed:  10/2/2019   Updated Objective Findings:  limited abduction, flexion L UE   TREATMENT:   Therapeutic Exercises: (*10 minutes):  Exercises per grid below to improve mobility, strength and balance. Required minimal visual, verbal and manual cues to promote proper body alignment and promote proper body posture. Progressed resistance and complexity of movement as indicated.      Date:  *19 Date:  10/1/19 Date:  10/2/19   Activity/Exercise Parameters Parameters Parameters   UBE 4/4 level 10 4/4 level 10 4/4/ level 9   Pulleys 5 minutes warm up to address lack of flexion and abduction 5'    Shrugs 20x 20#     Finger ladder  5x    Wall ER to flexion OH  *12x with paddles    Bicep Curl  0#, 3x10 Bar FM    Tricep Pulldown  33# 3x10 FM  23# 2x10 FM   Rows  3x10 20# 3x10 20#   High pull  30# 10x3 13# 15x-caused pain                          Manual Therapy Interventions: (28 minutes): Manual interventions performed to improve joint/soft tissue mobility and ROM to improve ability to perform ADLs. Patient responded well to all manual interventions with no significant increase in pain/symptoms. Improved pain reported below. Technique Used Grade  Level # Time(s) Effect while being performed   Oscillations  I-III GH 8 release   CFM  Biceps 8 Decrease cramping   Rythmic stabilization  GH 4 No pain   Strumming/release  deltoids 8 Decrease pain                     [facility] Portal  Treatment/Session Summary:  Nancy Bernabe reported pain and significant muscle fatigue with exercises today due to fatigue from yesterday's session. Pain and muscle cramping relieved post manual interventions performed above. Pt will benefit from TDN to improve muscle release/ROM and reduce pain next session    · Response to Treatment:  No increase in pain or adverse reactions  · Communication/Consultation:  Faxed initial evaluation to MD  · Equipment provided today:  HEP.   Recommendations/Intent for next treatment session: Next visit will focus on strengthening/functional activities    Total Treatment Billable Duration:  38 Rx  PT Patient Time In/Time Out  Time In: 2114  Time Out: [address]    Future Appointments   Date Time Provider Silvana Weiner   10/7/2019  1:45 PM Gunnar Mahm[facility]   10/9/2019  8:45 AM Mylo Paget N [facility]   10/11/2019  8:45 AM Gunnar [facility]

## 2019-10-07 ENCOUNTER — HOSPITAL ENCOUNTER (OUTPATIENT)
Dept: PHYSICAL THERAPY | Age: 44
Discharge: HOME | End: 2019-10-07
Payer: COMMERCIAL

## 2019-10-07 PROCEDURE — 97110 THERAPEUTIC EXERCISES: CPT

## 2019-10-07 NOTE — PROGRESS NOTES
Sandeepshelton Alanisray  : 1975  Payor: Ruben Espinoza / Plan: Sophie Boss / Product Type: Workers Comp /  [address] at [address], [address], [place], [address] [place] [address] Road  [phone]   [phone]                                                               OUTPATIENT PHYSICAL THERAPY: Daily Treatment Note 10/7/2019   Visit Count:  8    ICD-10: Treatment Diagnosis:   Pain in left shoulder (M25.512)  Bursitis of left shoulder (M75.52)           Muscle weakness (generalized) (M62.81)       Precautions/Allergies:   Patient has no known allergies. TREATMENT PLAN:  Effective Dates: 2019 TO 2019 (90 days). Frequency/Duration: 2-3 times a week for 90 Day(s) MEDICAL/REFERRING DIAGNOSIS:  s/p left shoulder open revision biceps tenodesis   DATE OF ONSET: *19  REFERRING PHYSICIAN: Zoe Ortiz MD MD Orders: Luis M Arellano and Treat  Return MD Appointment: *2019       Pre-treatment Symptoms/Complaints:  *Doing well. -Pt arrived late today and said he couldn't remember if his appointment was today or tomorrow. Pain: Initial:   4-5 Post Session:  2/10   Medications Last Reviewed:  10/7/2019   Updated Objective Findings:  limited abduction, flexion L UE   TREATMENT:   Therapeutic Exercises: (*25 minutes):  Exercises per grid below to improve mobility, strength and balance. Required minimal visual, verbal and manual cues to promote proper body alignment and promote proper body posture. Progressed resistance and complexity of movement as indicated.      Date:  *19 Date:  10/1/19 Date:  10/2/19 Date  10/7/19   Activity/Exercise Parameters Parameters Parameters    UBE 4/4 level 10 4/4 level 10 4/4/ level 9 2/2 level 9   Pulleys 5 minutes warm up to address lack of flexion and abduction 5'  3'   Shrugs 20x 20#   10# 20x   Finger ladder  5x     Wall ER to flexion OH  *12x with paddles     Bicep Curl  0#, 3x10 Bar FM  2x10 7# 10x 13#   Tricep Pulldown  33# 3x10 FM  23# 2x10 FM 20# 3x10   Rows  3x10 20# 3x10 20# 3x10 20#   High pull  30# 10x3 13# 15x-caused pain    Body blade    30\" 2x                      Manual Therapy Interventions: (0 minutes): Manual interventions performed to improve joint/soft tissue mobility and ROM to improve ability to perform ADLs. Patient responded well to all manual interventions with no significant increase in pain/symptoms. Improved pain reported below. Technique Used Grade  Level # Time(s) Effect while being performed   Oscillations  I-III GH 8 release   CFM  Biceps 8 Decrease cramping   Rythmic stabilization  GH 4 No pain   Strumming/release  deltoids 8 Decrease pain   TDN using 0.4mm/0.3mm  deltoids 10 Not today                     [employer] Portal  Treatment/Session Summary:  Juana Ortiz did well with exercises today. No TDN secondary to pt feeling good and no indication for manual interventions today. No increase in pain today. · Response to Treatment:  No increase in pain or adverse reactions  · Communication/Consultation:  Faxed initial evaluation to MD  · Equipment provided today:  HEP.   Recommendations/Intent for next treatment session: Next visit will focus on strengthening/functional activities    Total Treatment Billable Duration:  25 Rx  PT Patient Time In/Time Out  Time In: 6639  Time Out: R Naina Patel 119   Date Time Provider Silvana Cotei   10/9/2019  8:45 AM Tania Silveira [facility]   10/11/2019  8:45 AM Tania Silveira [facility]

## 2019-10-08 NOTE — PROGRESS NOTES
Juana Ortiz  : 1975  Payor: Mario Sicard / Plan: Clarisa Lozano / Product Type: Workers Comp /  [address] at [address] [facility], [address], [facility], [address] [place] [address] Road  [phone]   [phone]                                                               OUTPATIENT PHYSICAL THERAPY: Daily Treatment Note 10/9/2019   Visit Count:  9    ICD-10: Treatment Diagnosis:   Pain in left shoulder (M25.512)  Bursitis of left shoulder (M75.52)           Muscle weakness (generalized) (M62.81)       Precautions/Allergies:   Patient has no known allergies. TREATMENT PLAN:  Effective Dates: 2019 TO 2019 (90 days). Frequency/Duration: 2-3 times a week for 90 Day(s) MEDICAL/REFERRING DIAGNOSIS:  s/p left shoulder open revision biceps tenodesis   DATE OF ONSET: *19  REFERRING PHYSICIAN: Hanny Ortiz MD MD Orders: Larry Mcdaniels and Treat  Return MD Appointment: *2019       Pre-treatment Symptoms/Complaints:  *Doing well. -Pt arrived late today again with no excuse given. \"Im tired today\"  Pain: Initial:   4-5 Post Session:  2/10   Medications Last Reviewed:  10/9/2019   Updated Objective Findings:  limited abduction, flexion L UE   TREATMENT:   Therapeutic Exercises: (*30 minutes):  Exercises per grid below to improve mobility, strength and balance. Required minimal visual, verbal and manual cues to promote proper body alignment and promote proper body posture. Progressed resistance and complexity of movement as indicated.      Date:  *19 Date:  10/1/19 Date:  10/2/19 Date  10/7/19 Date  10/9/19   Activity/Exercise Parameters Parameters Parameters     UBE 4/4 level 10 4/4 level 10 4/4/ level 9 2/2 level 9 2/2/ level 10   Pulleys 5 minutes warm up to address lack of flexion and abduction 5'  3' 3'   Shrugs 20x 20#   10# 20x 10# 20x   Finger ladder  5x      Wall ER to flexion OH  *12x with paddles      Bicep Curl  0#, 3x10 Bar FM  2x10 7# 10x 13# 3x10 13# Tricep Pulldown  33# 3x10 FM  23# 2x10 FM 20# 3x10 23# 3x10   Rows  3x10 20# 3x10 20# 3x10 20# 3x10 27#   High pull  30# 10x3 13# 15x-caused pain     Body blade    30\" 2x 30\" vert and horizontal                        Manual Therapy Interventions: (0 minutes): Manual interventions performed to improve joint/soft tissue mobility and ROM to improve ability to perform ADLs. Patient responded well to all manual interventions with no significant increase in pain/symptoms. Improved pain reported below. Technique Used Grade  Level # Time(s) Effect while being performed   Oscillations  I-III GH 8 release   CFM  Biceps 8 Decrease cramping   Rythmic stabilization  GH 4 No pain   Strumming/release  deltoids 8 Decrease pain   TDN using 0.4mm/0.3mm  deltoids 10 Not today                     [employer] Portal  Treatment/Session Summary:  Brandon Nuñez did well with exercises today. No TDN secondary to pt feeling good and no indication for manual interventions today. No increase in pain today. Good progression continuation     · Response to Treatment:  No increase in pain or adverse reactions  · Communication/Consultation:  Faxed initial evaluation to MD  · Equipment provided today:  HEP.   Recommendations/Intent for next treatment session: Next visit will focus on strengthening/functional activities    Total Treatment Billable Duration:  30 Rx  PT Patient Time In/Time Out  Time In: 0900  Time Out: [address]    Future Appointments   Date Time Provider Silvana Weiner   10/11/2019  8:45 AM Gal Young [facility]

## 2019-10-09 ENCOUNTER — HOSPITAL ENCOUNTER (OUTPATIENT)
Dept: PHYSICAL THERAPY | Age: 44
Discharge: HOME | End: 2019-10-09
Payer: COMMERCIAL

## 2019-10-09 PROCEDURE — 97110 THERAPEUTIC EXERCISES: CPT

## 2019-10-11 ENCOUNTER — HOSPITAL ENCOUNTER (OUTPATIENT)
Dept: PHYSICAL THERAPY | Age: 44
Discharge: HOME | End: 2019-10-11
Payer: COMMERCIAL

## 2019-10-11 PROCEDURE — 97110 THERAPEUTIC EXERCISES: CPT

## 2019-10-11 NOTE — PROGRESS NOTES
Brandon Joaquin  : 1975  Payor: Kristy Skill / Plan: Amparo Almaraz / Product Type: Workers Comp /  [address] at [facility] [address], [place], [address] [place] [address] Road  [phone]   [phone]                                                               OUTPATIENT PHYSICAL THERAPY: Daily Treatment Note 10/11/2019   Visit Count:  10    ICD-10: Treatment Diagnosis:   Pain in left shoulder (M25.512)  Bursitis of left shoulder (M75.52)           Muscle weakness (generalized) (M62.81)       Precautions/Allergies:   Patient has no known allergies. TREATMENT PLAN:  Effective Dates: 2019 TO 2019 (90 days). Frequency/Duration: 2-3 times a week for 90 Day(s) MEDICAL/REFERRING DIAGNOSIS:  s/p left shoulder open revision biceps tenodesis   DATE OF ONSET: *19  REFERRING PHYSICIAN: Lo Ortiz MD MD Orders: Cyn Lacey and Treat  Return MD Appointment: *2019       Pre-treatment Symptoms/Complaints:  *Doing well. -Pt arrived 15 minutes late again for the 4th time with no excuse given. Pt told his appointment time would be changing to accommodate him more so he would maybe arrive on time. Pt states he was able to bench press 90# total the other day at the gym without issue for about 15 reps  Pain: Initial:   0 Post Session:  2/10   Medications Last Reviewed:  10/11/2019   Updated Objective Findings:  limited abduction, flexion L UE   TREATMENT:   Therapeutic Exercises: (*30 minutes):  Exercises per grid below to improve mobility, strength and balance. Required minimal visual, verbal and manual cues to promote proper body alignment and promote proper body posture. Progressed resistance and complexity of movement as indicated.      Date:  *19 Date:  10/1/19 Date:  10/2/19 Date  10/7/19 Date  10/9/19 Date  10/11/19   Activity/Exercise Parameters Parameters Parameters      UBE 4/4 level 10 4/4 level 10 4/4/ level 9 2/2 level 9 2/2/ level 10 3/3/ level 16   Pulleys 5 minutes warm up to address lack of flexion and abduction 5'  3' 3'    Shrugs 20x 20#   10# 20x 10# 20x 10# 30x 2 sets   Finger ladder  5x       Wall ER to flexion OH  *12x with paddles       Bicep Curl  0#, 3x10 Bar FM  2x10 7# 10x 13# 3x10 13# 3x10 13#   Tricep Pulldown  33# 3x10 FM  23# 2x10 FM 20# 3x10 23# 3x10 20# 3x10   Rows  3x10 20# 3x10 20# 3x10 20# 3x10 27# 3x10 30#   High pull  30# 10x3 13# 15x-caused pain      Body blade    30\" 2x 30\" vert and horizontal 1 min vert    Bosu stabilization SA punch      12x 1 set and 10x 5 sets   Shoulder taps      Low mat 3x10        Manual Therapy Interventions: (0 minutes): Manual interventions performed to improve joint/soft tissue mobility and ROM to improve ability to perform ADLs. Patient responded well to all manual interventions with no significant increase in pain/symptoms. Improved pain reported below. Technique Used Grade  Level # Time(s) Effect while being performed   Oscillations  I-III GH 8 release   CFM  Biceps 8 Decrease cramping   Rythmic stabilization  GH 4 No pain   Strumming/release  deltoids 8 Decrease pain   TDN using 0.4mm/0.3mm  deltoids 10 Not today                     [employer] Portal  Treatment/Session Summary:  Craig Chaudhry did well with exercises today. No TDN secondary to pt feeling good and no indication for manual interventions today. No increase in pain today. Good progression continuation with body weight strengthening as well. · Response to Treatment:  No increase in pain or adverse reactions  · Communication/Consultation:  Faxed initial evaluation to MD; pt's schedule changed  · Equipment provided today:  HEP.   Recommendations/Intent for next treatment session: Next visit will focus on strengthening/functional activities    Total Treatment Billable Duration:  30 Rx  PT Patient Time In/Time Out  Time In: 0900  Time Out: [address]    Future Appointments   Date Time Provider Silvana Weiner   10/15/2019 9:30 AM KIANNA DRAKE PT [facility]   10/22/2019  9:30 AM KIANNA DRAKE PT [facility]   10/24/2019  9:30 AM KIANNA DRAKE PT [facility]   10/29/2019  9:30 AM KIANNA DRAKE PT [facility]   10/31/2019  9:30 AM KIANNA DRAKE PT [facility]   11/5/2019  9:30 AM KIANNA DRAKE PT [facility]

## 2019-10-15 ENCOUNTER — HOSPITAL ENCOUNTER (OUTPATIENT)
Dept: PHYSICAL THERAPY | Age: 44
Discharge: HOME | End: 2019-10-15
Payer: COMMERCIAL

## 2019-10-15 PROCEDURE — 97110 THERAPEUTIC EXERCISES: CPT

## 2019-10-15 NOTE — PROGRESS NOTES
Kell [facility]  : 1975  Payor: Volodymyr  / Plan: Jeff Patelel / Product Type: Workers Comp /  [address] at [address], [address], [place], [address] [place] [address] Road  [phone]   [phone]                                                               OUTPATIENT PHYSICAL THERAPY: Daily Treatment Note 10/15/2019   Visit Count:  11    ICD-10: Treatment Diagnosis:   Pain in left shoulder (M25.512)  Bursitis of left shoulder (M75.52)           Muscle weakness (generalized) (M62.81)       Precautions/Allergies:   Patient has no known allergies. TREATMENT PLAN:  Effective Dates: 2019 TO 2019 (90 days). Frequency/Duration: 2-3 times a week for 90 Day(s) MEDICAL/REFERRING DIAGNOSIS:  s/p left shoulder open revision biceps tenodesis   DATE OF ONSET: *19  REFERRING PHYSICIAN: Garrick Ortiz MD MD Orders: Josiah Shutter and Treat  Return MD Appointment: *2019       Pre-treatment Symptoms/Complaints: Patient reports going to the gym regularly. Doing rows, pull downs, bicep machine 40#. Pain: Initial:   0 Post Session:     Medications Last Reviewed:  10/15/2019   Updated Objective Findings:  limited abduction, flexion L UE   TREATMENT:   Therapeutic Exercises: (45 minutes):  Exercises per grid below to improve mobility, strength and balance. Required minimal visual, verbal and manual cues to promote proper body alignment and promote proper body posture. Progressed resistance and complexity of movement as indicated.      Date:  *19 Date:  10/1/19 Date:  10/2/19 Date  10/7/19 Date  10/9/19 Date  10/11/19 Date  10/15/19   Activity/Exercise Parameters Parameters Parameters       UBE 4/4 level 10 4/4 level 10 4/4/ level 9 2/2 level 9 2/2/ level 10 3/3/ level 16 L17 3 frw/bkrw   Pulleys 5 minutes warm up to address lack of flexion and abduction 5'  3' 3'  5 min   Shrugs 20x 20#   10# 20x 10# 20x 10# 30x 2 sets    Finger ladder  5x        Wall ER to flexion OH  *12x with paddles        Bicep Curl  0#, 3x10 Bar FM  2x10 7# 10x 13# 3x10 13# 3x10 13# 20# to heavy  10# full ROM   Tricep Pulldown  33# 3x10 FM  23# 2x10 FM 20# 3x10 23# 3x10 20# 3x10    Rows  3x10 20# 3x10 20# 3x10 20# 3x10 27# 3x10 30# 30# 3x10   High pull  30# 10x3 13# 15x-caused pain    13# rotation with pull  Rotation with punch 10x3   Body blade    30\" 2x 30\" vert and horizontal 1 min vert     Bosu stabilization SA punch      12x 1 set and 10x 5 sets    Shoulder taps      Low mat 3x10    Plank on floor       Up and down on 1\" block 5x  Felt it was to hard                  Manual Therapy Interventions: (0 minutes): Manual interventions performed to improve joint/soft tissue mobility and ROM to improve ability to perform ADLs. Patient responded well to all manual interventions with no significant increase in pain/symptoms. Improved pain reported below. Technique Used Grade  Level # Time(s) Effect while being performed   Oscillations  I-III GH 8 release   CFM  Biceps 8 Decrease cramping   Rythmic stabilization  GH 4 No pain   Strumming/release  deltoids 8 Decrease pain   TDN using 0.4mm/0.3mm  deltoids 10 Not today     [employer] Portal  Treatment/Session Summary:  Jessika Mojica reported to me he was doing plank exercises on the [facility] last visit but strongly felt that plank on the floor was to much for his shoulder. He is not comfortable doing that and has fear of re injury. He can likely continue strengthening on his own as he is compliant with his program and does not feel comfortable with higher level activities. Will continue to try and progress slower next visit. · Response to Treatment:  No increase in pain or adverse reactions  · Communication/Consultation:  Faxed initial evaluation to MD; pt's schedule changed  · Equipment provided today:  HEP.   Recommendations/Intent for next treatment session: Next visit will focus on strengthening/functional activities    Total Treatment Billable Duration:  45 Rx  PT Patient Time In/Time Out  Time In: 0930  Time Out: 1015  Real Torres, PTA    Future Appointments   Date Time Provider Silvana Weiner   10/17/2019  9:00 AM Romario Jorgensen [facility]   10/22/2019  9:30 [facility]   10/24/2019  9:30 AM [facility]   10/29/2019  9:30 AM [facility]   10/31/2019  9:30 AM KIANNA DRAKE PT [facility]   11/5/2019  9:30 AM [facility]

## 2019-10-17 ENCOUNTER — HOSPITAL ENCOUNTER (OUTPATIENT)
Dept: PHYSICAL THERAPY | Age: 44
Discharge: HOME | End: 2019-10-17
Payer: COMMERCIAL

## 2019-10-17 PROCEDURE — 97110 THERAPEUTIC EXERCISES: CPT

## 2019-10-17 NOTE — PROGRESS NOTES
Merna Vero  : 1975  Payor: [employer] / Plan: Ricardo Buckle / Product Type: Workers Comp /  [id number] at [address], [address], [facility], [address] [place] [address]  [phone]   [phone]                                                               OUTPATIENT PHYSICAL THERAPY: Daily Treatment Note and Progress note 10/17/2019   Visit Count:  12    ICD-10: Treatment Diagnosis:   Pain in left shoulder (M25.512)  Bursitis of left shoulder (M75.52)           Muscle weakness (generalized) (M62.81)       Precautions/Allergies:   Patient has no known allergies. TREATMENT PLAN:  Effective Dates: 2019 TO 2019 (90 days). Frequency/Duration: 2-3 times a week for 90 Day(s) MEDICAL/REFERRING DIAGNOSIS:  s/p left shoulder open revision biceps tenodesis   DATE OF ONSET: *19  REFERRING PHYSICIAN: Nancy Ortiz MD MD Orders: Fanny Lynn and Treat  Return MD Appointment: *2019       Pre-treatment Symptoms/Complaints: Patient reports going to the gym regularly. Doing shoulder day today and did a full ROM back exercises and was able to do 2x15 burpees without the pushup part though, that felt good yesterday. Not comfortable with planks still. Pain: Initial:   0 Post Session:     Medications Last Reviewed:  10/17/2019     Updated Objective Findings:  limited abduction, flexion L UE   Shoulder AROM: (standing)  Flexion: 152  ABduction: 160  Strength L= 5/5 flex/abduction and elbow flex/ext  Patient has made good progress with strength and ROM and independent with his exercise program.     TREATMENT:   Therapeutic Exercises: (40 minutes):  Exercises per grid below to improve mobility, strength and balance. Required minimal visual, verbal and manual cues to promote proper body alignment and promote proper body posture. Progressed resistance and complexity of movement as indicated.      Date:  *19 Date:  10/1/19 Date:  10/2/19 Date  10/7/19 Date  10/9/19 Date  10/11/19 Date  10/15/19 Date  10/17/19   Activity/Exercise Parameters Parameters Parameters        UBE 4/4 level 10 4/4 level 10 4/4/ level 9 2/2 level 9 2/2/ level 10 3/3/ level 16 L17 3 frw/bkrw L12 4F/B   Pulleys 5 minutes warm up to address lack of flexion and abduction 5'  3' 3'  5 min 4 min CD   Shrugs 20x 20#   10# 20x 10# 20x 10# 30x 2 sets  10# 30x2 sets   Finger ladder  5x         Wall ER to flexion OH  *12x with paddles         Bicep Curl  0#, 3x10 Bar FM  2x10 7# 10x 13# 3x10 13# 3x10 13# 20# to heavy  10# full ROM 3x10 10#-did not want to do 20# today   Tricep Pulldown  33# 3x10 FM  23# 2x10 FM 20# 3x10 23# 3x10 20# 3x10  20# 3x12   Rows  3x10 20# 3x10 20# 3x10 20# 3x10 27# 3x10 30# 30# 3x10 30# 3x12   High pull  30# 10x3 13# 15x-caused pain    13# rotation with pull  Rotation with punch 10x3    Body blade    30\" 2x 30\" vert and horizontal 1 min vert   2 min vert/2 min horizontal   Bosu stabilization SA punch      12x 1 set and 10x 5 sets     Shoulder taps      Low mat 3x10  On raised table  2x20   Plank on floor       Up and down on 1\" block 5x  Felt it was to hard    pushup        3x6 on thigh-height table                   Manual Therapy Interventions: (0 minutes): Manual interventions performed to improve joint/soft tissue mobility and ROM to improve ability to perform ADLs. Patient responded well to all manual interventions with no significant increase in pain/symptoms. Improved pain reported below. Technique Used Grade  Level # Time(s) Effect while being performed   Oscillations  I-III GH 8 release   CFM  Biceps 8 Decrease cramping   Rythmic stabilization  GH 4 No pain   Strumming/release  deltoids 8 Decrease pain   TDN using 0.4mm/0.3mm  deltoids 10 Not today     [facility] Portal  Treatment/Session Summary:  Hilary James said planks on floor was too challenging and also did not want to do a lot of increased weight today.  He continues to be challenged with modified planks on thigh-high table top. His strength has progressed well and he is independent with his exercise program.     · Response to Treatment:  No increase in pain or adverse reactions  · Communication/Consultation:  Faxed initial evaluation to MD; pt's schedule changed  · Equipment provided today:  HEP.   Recommendations/Intent for next treatment session: Next visit will focus on strengthening/functional activities    Total Treatment Billable Duration:  40 Rx  PT Patient Time In/Time Out  Time In: 8994  Time Out: [address]    Future Appointments   Date Time Provider Silvana Weiner   10/22/2019  9:30 AM KIANNA DRAKE PT [facility]   10/24/2019  9:30 AM KIANNA DRAKE PT [facility]   10/29/2019  9:30 AM KIANNA DRAKE PT [facility]   10/31/2019  9:30 AM KIANNA DRAKE PT [facility]   11/5/2019  9:30 AM KIANNA DRAKE PT [facility]

## 2019-10-31 ENCOUNTER — HOSPITAL ENCOUNTER (OUTPATIENT)
Dept: PHYSICAL THERAPY | Age: 44
End: 2019-10-31
Payer: COMMERCIAL

## 2019-11-05 ENCOUNTER — APPOINTMENT (OUTPATIENT)
Dept: PHYSICAL THERAPY | Age: 44
End: 2019-11-05

## 2019-11-07 ENCOUNTER — APPOINTMENT (OUTPATIENT)
Dept: PHYSICAL THERAPY | Age: 44
End: 2019-11-07
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
## (undated) DEVICE — CARDINAL HEALTH FLEXIBLE LIGHT HANDLE COVER: Brand: CARDINAL HEALTH

## (undated) DEVICE — GUARDIAN LVC: Brand: GUARDIAN

## (undated) DEVICE — (D)STRIP SKN CLSR 0.5X4IN WHT --

## (undated) DEVICE — SUTURE VCRL SZ 0 L27IN ABSRB UD L36MM CP-1 1/2 CIR REV CUT J267H

## (undated) DEVICE — 3M™ STERI-DRAPE™ INCISE DRAPE 1050 (60CM X 45CM): Brand: STERI-DRAPE™

## (undated) DEVICE — SOLUTION IV 1000ML 0.9% SOD CHL

## (undated) DEVICE — MEDI-VAC NON-CONDUCTIVE SUCTION TUBING: Brand: CARDINAL HEALTH

## (undated) DEVICE — MEDI-VAC YANKAUER SUCTION HANDLE W/BULBOUS TIP: Brand: CARDINAL HEALTH

## (undated) DEVICE — PACK,SHOULDER,DRAPE,POUCH: Brand: MEDLINE

## (undated) DEVICE — 90-S, SUCTION PROBE, NON-BENDABLE, MAX CUT LEVEL 11: Brand: SERFAS ENERGY

## (undated) DEVICE — ELECTRODE NDL 2.8IN COAT VALLEYLAB

## (undated) DEVICE — REM POLYHESIVE ADULT PATIENT RETURN ELECTRODE: Brand: VALLEYLAB

## (undated) DEVICE — PUDDLEVAC FLOOR SUCTION DEVICE: Brand: PUDDLEVAC

## (undated) DEVICE — (D)PREP SKN CHLRAPRP APPL 26ML -- CONVERT TO ITEM 371833

## (undated) DEVICE — SUTURE VCRL SZ 2-0 L36IN ABSRB VLT L36MM CT-1 1/2 CIR J345H

## (undated) DEVICE — AMD ANTIMICROBIAL GAUZE SPONGES,12 PLY USP TYPE VII, 0.2% POLYHEXAMETHYLENE BIGUANIDE HCI (PHMB): Brand: CURITY

## (undated) DEVICE — SUTURE PROL SZ 2-0 L18IN NONABSORBABLE BLU FS L26MM 3/8 CIR 8685H

## (undated) DEVICE — OUTFLOW CASSETTE TUBING, DO NOT USE IF PACKAGE IS DAMAGED: Brand: CROSSFLOW

## (undated) DEVICE — SLING ARM SWTH UNIV FOAM 1 SZ FITS MOST

## (undated) DEVICE — DRAPE,TOP,102X53,STERILE: Brand: MEDLINE

## (undated) DEVICE — SOLUTION IRRIG 3000ML 0.9% SOD CHL FLX CONT 0797208] ICU MEDICAL INC]

## (undated) DEVICE — HANDPIECE SET WITH COAXIAL HIGH FLOW TIP AND SUCTION TUBE: Brand: INTERPULSE

## (undated) DEVICE — [RESECTOR CUTTER, ARTHROSCOPIC SHAVER BLADE,  DO NOT RESTERILIZE,  DO NOT USE IF PACKAGE IS DAMAGED,  KEEP DRY,  KEEP AWAY FROM SUNLIGHT]: Brand: FORMULA

## (undated) DEVICE — BUR SHV CUT HLLW 6 FLUT 5.5MM --

## (undated) DEVICE — GOWN,REINFORCED,POLY,AURORA,XXLARGE,STR: Brand: MEDLINE

## (undated) DEVICE — SUTURE ETHBND 2 L30IN NONABSORBABLE GRN WHT LT GRN MO-7 D8793

## (undated) DEVICE — SUTURE ETHBND EXCEL SZ 2 L27IN NONABSORBABLE GRN WHT MO-7 D7485

## (undated) DEVICE — SHEET, DRAPE, SPLIT, STERILE: Brand: MEDLINE

## (undated) DEVICE — 2000CC GUARDIAN II: Brand: GUARDIAN

## (undated) DEVICE — STOCKINETTE TUBE 6X48 -- MEDICHOICE

## (undated) DEVICE — BLADE SHV CUT MENIS AGG + 4MM --

## (undated) DEVICE — ABDOMINAL PAD: Brand: DERMACEA

## (undated) DEVICE — DRAPE,U/SHT,SPLIT,FILM,60X84,STERILE: Brand: MEDLINE

## (undated) DEVICE — SUTURE VCRL SZ 0 L36IN ABSRB UD L36MM CT-1 1/2 CIR J946H

## (undated) DEVICE — BUTTON SWITCH PENCIL BLADE ELECTRODE, HOLSTER: Brand: EDGE

## (undated) DEVICE — INFLOW CASSETTE TUBING, DO NOT USE IF PACKAGE IS DAMAGED: Brand: CROSSFLOW

## (undated) DEVICE — BANDAGE COMPR SELF ADH 5 YDX4 IN TAN STRL PREMIERPRO LF

## (undated) DEVICE — SUTURE ETHLN SZ 2-0 L18IN NONABSORBABLE BLK L26MM PS 3/8 585H

## (undated) DEVICE — SURGICAL PROCEDURE PACK BASIC ST FRANCIS

## (undated) DEVICE — GOWN,PREVENTION PLUS,2XL,ST,22/CS: Brand: MEDLINE

## (undated) DEVICE — NDL SPNE QNCKE 18GX3.5IN LF --

## (undated) DEVICE — SPONGE LAP 18X18IN STRL -- 5/PK